# Patient Record
Sex: FEMALE | Race: BLACK OR AFRICAN AMERICAN | NOT HISPANIC OR LATINO | URBAN - METROPOLITAN AREA
[De-identification: names, ages, dates, MRNs, and addresses within clinical notes are randomized per-mention and may not be internally consistent; named-entity substitution may affect disease eponyms.]

---

## 2023-04-08 LAB — URINE CULTURE: NO GROWTH

## 2023-05-02 ENCOUNTER — OFFICE VISIT (OUTPATIENT)
Dept: PRIMARY CARE | Facility: CLINIC | Age: 33
End: 2023-05-02
Payer: COMMERCIAL

## 2023-05-02 ENCOUNTER — APPOINTMENT (OUTPATIENT)
Dept: PRIMARY CARE | Facility: CLINIC | Age: 33
End: 2023-05-02
Payer: COMMERCIAL

## 2023-05-02 VITALS
SYSTOLIC BLOOD PRESSURE: 129 MMHG | HEART RATE: 70 BPM | WEIGHT: 293 LBS | BODY MASS INDEX: 45.99 KG/M2 | HEIGHT: 67 IN | DIASTOLIC BLOOD PRESSURE: 67 MMHG

## 2023-05-02 DIAGNOSIS — E66.01 MORBID OBESITY (MULTI): ICD-10-CM

## 2023-05-02 DIAGNOSIS — Z00.00 ANNUAL PHYSICAL EXAM: ICD-10-CM

## 2023-05-02 DIAGNOSIS — Z01.419 ENCOUNTER FOR GYNECOLOGICAL EXAMINATION WITHOUT ABNORMAL FINDING: Primary | ICD-10-CM

## 2023-05-02 PROBLEM — E88.810 DYSMETABOLIC SYNDROME: Status: ACTIVE | Noted: 2023-05-02

## 2023-05-02 PROBLEM — R53.83 FATIGUE: Status: ACTIVE | Noted: 2023-05-02

## 2023-05-02 PROBLEM — R06.83 SNORING: Status: ACTIVE | Noted: 2023-05-02

## 2023-05-02 PROBLEM — R29.818 SUSPECTED SLEEP APNEA: Status: ACTIVE | Noted: 2023-05-02

## 2023-05-02 PROCEDURE — 3008F BODY MASS INDEX DOCD: CPT | Performed by: NURSE PRACTITIONER

## 2023-05-02 PROCEDURE — 88141 CYTOPATH C/V INTERPRET: CPT | Performed by: PATHOLOGY

## 2023-05-02 PROCEDURE — 87624 HPV HI-RISK TYP POOLED RSLT: CPT

## 2023-05-02 PROCEDURE — 80061 LIPID PANEL: CPT

## 2023-05-02 PROCEDURE — 1036F TOBACCO NON-USER: CPT | Performed by: NURSE PRACTITIONER

## 2023-05-02 PROCEDURE — 82652 VIT D 1 25-DIHYDROXY: CPT

## 2023-05-02 PROCEDURE — 85025 COMPLETE CBC W/AUTO DIFF WBC: CPT

## 2023-05-02 PROCEDURE — 87205 SMEAR GRAM STAIN: CPT

## 2023-05-02 PROCEDURE — 99203 OFFICE O/P NEW LOW 30 MIN: CPT | Performed by: NURSE PRACTITIONER

## 2023-05-02 PROCEDURE — 99385 PREV VISIT NEW AGE 18-39: CPT | Performed by: NURSE PRACTITIONER

## 2023-05-02 PROCEDURE — 80053 COMPREHEN METABOLIC PANEL: CPT

## 2023-05-02 PROCEDURE — 88175 CYTOPATH C/V AUTO FLUID REDO: CPT

## 2023-05-02 PROCEDURE — 82607 VITAMIN B-12: CPT

## 2023-05-02 PROCEDURE — 84443 ASSAY THYROID STIM HORMONE: CPT

## 2023-05-02 RX ORDER — FOLIC ACID 1 MG/1
4 TABLET ORAL DAILY
COMMUNITY
Start: 2023-04-02 | End: 2024-03-08

## 2023-05-02 RX ORDER — LEVETIRACETAM 500 MG/1
1 TABLET ORAL 2 TIMES DAILY
COMMUNITY
Start: 2023-04-17 | End: 2024-03-08 | Stop reason: SDUPTHER

## 2023-05-02 RX ORDER — MIDAZOLAM 5 MG/.1ML
SPRAY NASAL
COMMUNITY
Start: 2023-03-15

## 2023-05-02 NOTE — PROGRESS NOTES
"Reason for Visit: Annual Physical Exam    HPI: Chelsea is a 33 year old female who presents to the office today for a physical exam.     No past surgical history. Medical history of seizures. She is on Keppra and folic acid. No known drug allergies.     Sees neurology for seizure management. Stated she was diagnosed with seizures in December, 2021; after having Covid. Not currently in a relationship. Does not have any children. Works at a  center. Does not smoke cigarettes, drink alcohol or use recreational drugs.     Her menstrual cycles are irregular. The duration of periods is 5 days. LMP on 3/27/2023; misses 3 cycles/year. Possible family hx of PCOS (sister). She is interested in losing weight; would like to see nutritionist.    Paternal grandmother with history of CHF.       Active Problem List  Patient Active Problem List   Diagnosis    Suspected sleep apnea    Snoring    Morbid obesity (CMS/HCC)    Fatigue    Dysmetabolic syndrome       Comprehensive Medical/Surgical/Social/Family History  Past Medical History:   Diagnosis Date    Seizures (CMS/HCC) 12/2021    Urinary tract infection 4/01/23     Past Surgical History:   Procedure Laterality Date    OTHER SURGICAL HISTORY  08/16/2022    No history of surgery    WISDOM TOOTH EXTRACTION       Social History     Social History Narrative    Not on file         Allergies and Medications  Patient has no known allergies.  No current outpatient medications on file prior to visit.     No current facility-administered medications on file prior to visit.         ROS otherwise negative aside from what was mentioned above in HPI.    Vitals  /67   Pulse 70   Ht 1.702 m (5' 7\")   Wt 135 kg (298 lb)   BMI 46.67 kg/m²   Body mass index is 46.67 kg/m².  Physical Exam  Gen: Alert, NAD  HEENT:  PERRLA, EOMI, conjunctiva and sclera normal in appearance. External auditory canals/TMs normal; Oral cavity and posterior pharynx without lesions/exudate  Neck:  " Supple with FROM; No masses/nodes palpable; Thyroid nontender and without nodules; No GIL  Respiratory:  Lungs CTAB  Cardiovascular:  Heart RRR. No M/R/G. Peripheral pulses equal bilaterally  Abdomen:  Soft, nontender, BS present throughout; No R/G/R; No HSM or masses palpated  Extremities:  FROM all extremities; Muscle strength grossly normal with good tone  Neuro:  CN II-XII intact; Reflexes 2+/2+; Gross motor and sensory intact  Skin:  No suspicious lesions present  Breast: No masses, skin lesions or nipple discharges, no axillary lymphadenopathy    Assessment and Plan:  Problem List Items Addressed This Visit          Endocrine/Metabolic    Morbid obesity (CMS/[facility])    Relevant Orders    Referral to Nutrition Services     Other Visit Diagnoses       Encounter for gynecological examination without abnormal finding    -  Primary    Relevant Orders    THINPREP PAP TEST    Vaginitis Gram Stain For Bacterial Vaginosis + Yeast (Completed)    Annual physical exam        Relevant Orders    CBC and Auto Differential (Completed)    Comprehensive Metabolic Panel (Completed)    TSH with reflex to Free T4 if abnormal (Completed)    Vitamin D 1,25 Dihydroxy    Vitamin B12 (Completed)    Lipid Panel (Completed)    BMI 40.0-44.9, adult (CMS/[facility])        Relevant Orders    Referral to Nutrition Services        1) Routine GYN: pap done. You tolerated well. Vaginal swab collected for BV.    2) Annual exam: blood work ordered.    3) Obesity: counseled on weight loss to improve health, cardiovascular health, decrease risk of diabetes and decrease wear and tear on weight bearing joints which will alleviate joint pain. Encouraged exercising such as starting to walk; 15-30 minutes daily, increasing goal of exercise to >150 minutes/week with emphasis on cardio, strengthening and toning. Discussed benefits of gym membership and/or . Cut back on daily calories to 5781-3354 kcal diet. Watch daily carbs such as breads, pasta, rice,  starchy vegetables and sweets. Referral placed for nutritionist.    4) Seizures: stable. Continue Keppra and follow-up with neurology.

## 2023-05-03 LAB
ALANINE AMINOTRANSFERASE (SGPT) (U/L) IN SER/PLAS: 15 U/L (ref 7–45)
ALBUMIN (G/DL) IN SER/PLAS: 3.9 G/DL (ref 3.4–5)
ALKALINE PHOSPHATASE (U/L) IN SER/PLAS: 113 U/L (ref 33–110)
ANION GAP IN SER/PLAS: 13 MMOL/L (ref 10–20)
ASPARTATE AMINOTRANSFERASE (SGOT) (U/L) IN SER/PLAS: 19 U/L (ref 9–39)
BASOPHILS (10*3/UL) IN BLOOD BY AUTOMATED COUNT: 0.07 X10E9/L (ref 0–0.1)
BASOPHILS/100 LEUKOCYTES IN BLOOD BY AUTOMATED COUNT: 1.3 % (ref 0–2)
BILIRUBIN TOTAL (MG/DL) IN SER/PLAS: 0.3 MG/DL (ref 0–1.2)
CALCIUM (MG/DL) IN SER/PLAS: 9.9 MG/DL (ref 8.6–10.6)
CARBON DIOXIDE, TOTAL (MMOL/L) IN SER/PLAS: 28 MMOL/L (ref 21–32)
CHLORIDE (MMOL/L) IN SER/PLAS: 102 MMOL/L (ref 98–107)
CHOLESTEROL (MG/DL) IN SER/PLAS: 206 MG/DL (ref 0–199)
CHOLESTEROL IN HDL (MG/DL) IN SER/PLAS: 59.8 MG/DL
CHOLESTEROL/HDL RATIO: 3.4
CLUE CELLS: NORMAL
COBALAMIN (VITAMIN B12) (PG/ML) IN SER/PLAS: 1120 PG/ML (ref 211–911)
CREATININE (MG/DL) IN SER/PLAS: 0.78 MG/DL (ref 0.5–1.05)
EOSINOPHILS (10*3/UL) IN BLOOD BY AUTOMATED COUNT: 0.21 X10E9/L (ref 0–0.7)
EOSINOPHILS/100 LEUKOCYTES IN BLOOD BY AUTOMATED COUNT: 4 % (ref 0–6)
ERYTHROCYTE DISTRIBUTION WIDTH (RATIO) BY AUTOMATED COUNT: 12.8 % (ref 11.5–14.5)
ERYTHROCYTE MEAN CORPUSCULAR HEMOGLOBIN CONCENTRATION (G/DL) BY AUTOMATED: 30.7 G/DL (ref 32–36)
ERYTHROCYTE MEAN CORPUSCULAR VOLUME (FL) BY AUTOMATED COUNT: 96 FL (ref 80–100)
ERYTHROCYTES (10*6/UL) IN BLOOD BY AUTOMATED COUNT: 4.44 X10E12/L (ref 4–5.2)
GFR FEMALE: >90 ML/MIN/1.73M2
GLUCOSE (MG/DL) IN SER/PLAS: 78 MG/DL (ref 74–99)
HEMATOCRIT (%) IN BLOOD BY AUTOMATED COUNT: 42.7 % (ref 36–46)
HEMOGLOBIN (G/DL) IN BLOOD: 13.1 G/DL (ref 12–16)
IMMATURE GRANULOCYTES/100 LEUKOCYTES IN BLOOD BY AUTOMATED COUNT: 0.4 % (ref 0–0.9)
LDL: 130 MG/DL (ref 0–99)
LEUKOCYTES (10*3/UL) IN BLOOD BY AUTOMATED COUNT: 5.3 X10E9/L (ref 4.4–11.3)
LYMPHOCYTES (10*3/UL) IN BLOOD BY AUTOMATED COUNT: 2.29 X10E9/L (ref 1.2–4.8)
LYMPHOCYTES/100 LEUKOCYTES IN BLOOD BY AUTOMATED COUNT: 43.3 % (ref 13–44)
MONOCYTES (10*3/UL) IN BLOOD BY AUTOMATED COUNT: 0.31 X10E9/L (ref 0.1–1)
MONOCYTES/100 LEUKOCYTES IN BLOOD BY AUTOMATED COUNT: 5.9 % (ref 2–10)
NEUTROPHILS (10*3/UL) IN BLOOD BY AUTOMATED COUNT: 2.39 X10E9/L (ref 1.2–7.7)
NEUTROPHILS/100 LEUKOCYTES IN BLOOD BY AUTOMATED COUNT: 45.1 % (ref 40–80)
NRBC (PER 100 WBCS) BY AUTOMATED COUNT: 0 /100 WBC (ref 0–0)
NUGENT SCORE: 3
PLATELETS (10*3/UL) IN BLOOD AUTOMATED COUNT: 295 X10E9/L (ref 150–450)
POTASSIUM (MMOL/L) IN SER/PLAS: 4.6 MMOL/L (ref 3.5–5.3)
PROTEIN TOTAL: 7.4 G/DL (ref 6.4–8.2)
SODIUM (MMOL/L) IN SER/PLAS: 138 MMOL/L (ref 136–145)
THYROTROPIN (MIU/L) IN SER/PLAS BY DETECTION LIMIT <= 0.05 MIU/L: 1.61 MIU/L (ref 0.44–3.98)
TRIGLYCERIDE (MG/DL) IN SER/PLAS: 83 MG/DL (ref 0–149)
UREA NITROGEN (MG/DL) IN SER/PLAS: 8 MG/DL (ref 6–23)
VLDL: 17 MG/DL (ref 0–40)
YEAST: NORMAL

## 2023-05-05 LAB — VITAMIN D 1,25-DIHYDROXY: 28.1 PG/ML (ref 19.9–79.3)

## 2023-05-08 DIAGNOSIS — E55.9 VITAMIN D DEFICIENCY: ICD-10-CM

## 2023-05-08 RX ORDER — ACETAMINOPHEN 500 MG
50 TABLET ORAL DAILY
Qty: 90 CAPSULE | Refills: 1 | Status: SHIPPED | OUTPATIENT
Start: 2023-05-08 | End: 2023-11-06

## 2023-05-10 LAB
COMPLETE PATHOLOGY REPORT: NORMAL
CONVERTED CLINICAL DIAGNOSIS-HISTORY: NORMAL
CONVERTED DIAGNOSIS COMMENT: NORMAL
CONVERTED FINAL DIAGNOSIS: NORMAL
CONVERTED FINAL REPORT PDF LINK TO COPY AND PASTE: NORMAL

## 2023-05-12 ENCOUNTER — TELEPHONE (OUTPATIENT)
Dept: PRIMARY CARE | Facility: CLINIC | Age: 33
End: 2023-05-12
Payer: COMMERCIAL

## 2023-05-12 DIAGNOSIS — Z01.419 ENCOUNTER FOR GYNECOLOGICAL EXAMINATION WITHOUT ABNORMAL FINDING: ICD-10-CM

## 2023-05-12 DIAGNOSIS — Z01.411 ENCOUNTER FOR GYNECOLOGICAL EXAMINATION WITH ABNORMAL FINDING: ICD-10-CM

## 2023-05-12 NOTE — TELEPHONE ENCOUNTER
----- Message from MONTEZ Sullivan sent at 5/11/2023 10:52 PM EDT -----  Pap shows abnormal cells and HPV. Needs referral to GYN.

## 2023-11-06 DIAGNOSIS — E55.9 VITAMIN D DEFICIENCY: ICD-10-CM

## 2023-11-06 RX ORDER — ACETAMINOPHEN 500 MG
TABLET ORAL DAILY
Qty: 90 CAPSULE | Refills: 1 | Status: SHIPPED | OUTPATIENT
Start: 2023-11-06 | End: 2024-04-29 | Stop reason: SDUPTHER

## 2024-03-08 ENCOUNTER — HOSPITAL ENCOUNTER (EMERGENCY)
Facility: HOSPITAL | Age: 34
Discharge: HOME | End: 2024-03-08
Attending: EMERGENCY MEDICINE
Payer: COMMERCIAL

## 2024-03-08 VITALS
SYSTOLIC BLOOD PRESSURE: 109 MMHG | HEART RATE: 84 BPM | OXYGEN SATURATION: 100 % | BODY MASS INDEX: 37.67 KG/M2 | WEIGHT: 240 LBS | RESPIRATION RATE: 18 BRPM | HEIGHT: 67 IN | DIASTOLIC BLOOD PRESSURE: 48 MMHG | TEMPERATURE: 98.3 F

## 2024-03-08 DIAGNOSIS — G40.919 BREAKTHROUGH SEIZURE (MULTI): Primary | ICD-10-CM

## 2024-03-08 LAB
ALBUMIN SERPL BCP-MCNC: 3.6 G/DL (ref 3.4–5)
ALP SERPL-CCNC: 97 U/L (ref 33–110)
ALT SERPL W P-5'-P-CCNC: 17 U/L (ref 7–45)
ANION GAP SERPL CALC-SCNC: 11 MMOL/L (ref 10–20)
AST SERPL W P-5'-P-CCNC: 20 U/L (ref 9–39)
BASOPHILS # BLD AUTO: 0.05 X10*3/UL (ref 0–0.1)
BASOPHILS NFR BLD AUTO: 0.7 %
BILIRUB SERPL-MCNC: 0.3 MG/DL (ref 0–1.2)
BUN SERPL-MCNC: 9 MG/DL (ref 6–23)
CALCIUM SERPL-MCNC: 9.2 MG/DL (ref 8.6–10.3)
CHLORIDE SERPL-SCNC: 101 MMOL/L (ref 98–107)
CO2 SERPL-SCNC: 29 MMOL/L (ref 21–32)
CREAT SERPL-MCNC: 0.95 MG/DL (ref 0.5–1.05)
EGFRCR SERPLBLD CKD-EPI 2021: 81 ML/MIN/1.73M*2
EOSINOPHIL # BLD AUTO: 0.22 X10*3/UL (ref 0–0.7)
EOSINOPHIL NFR BLD AUTO: 3.1 %
ERYTHROCYTE [DISTWIDTH] IN BLOOD BY AUTOMATED COUNT: 12.3 % (ref 11.5–14.5)
GLUCOSE SERPL-MCNC: 107 MG/DL (ref 74–99)
HCT VFR BLD AUTO: 40.3 % (ref 36–46)
HGB BLD-MCNC: 12.9 G/DL (ref 12–16)
IMM GRANULOCYTES # BLD AUTO: 0.03 X10*3/UL (ref 0–0.7)
IMM GRANULOCYTES NFR BLD AUTO: 0.4 % (ref 0–0.9)
LEVETIRACETAM SERPL-MCNC: 12 UG/ML (ref 10–40)
LYMPHOCYTES # BLD AUTO: 2.63 X10*3/UL (ref 1.2–4.8)
LYMPHOCYTES NFR BLD AUTO: 36.5 %
MAGNESIUM SERPL-MCNC: 1.5 MG/DL (ref 1.6–2.4)
MCH RBC QN AUTO: 28.5 PG (ref 26–34)
MCHC RBC AUTO-ENTMCNC: 32 G/DL (ref 32–36)
MCV RBC AUTO: 89 FL (ref 80–100)
MONOCYTES # BLD AUTO: 0.67 X10*3/UL (ref 0.1–1)
MONOCYTES NFR BLD AUTO: 9.3 %
NEUTROPHILS # BLD AUTO: 3.6 X10*3/UL (ref 1.2–7.7)
NEUTROPHILS NFR BLD AUTO: 50 %
NRBC BLD-RTO: 0 /100 WBCS (ref 0–0)
PLATELET # BLD AUTO: 384 X10*3/UL (ref 150–450)
POTASSIUM SERPL-SCNC: 3.9 MMOL/L (ref 3.5–5.3)
PROT SERPL-MCNC: 7.4 G/DL (ref 6.4–8.2)
RBC # BLD AUTO: 4.52 X10*6/UL (ref 4–5.2)
SODIUM SERPL-SCNC: 137 MMOL/L (ref 136–145)
WBC # BLD AUTO: 7.2 X10*3/UL (ref 4.4–11.3)

## 2024-03-08 PROCEDURE — 2500000001 HC RX 250 WO HCPCS SELF ADMINISTERED DRUGS (ALT 637 FOR MEDICARE OP): Performed by: EMERGENCY MEDICINE

## 2024-03-08 PROCEDURE — 36415 COLL VENOUS BLD VENIPUNCTURE: CPT | Performed by: EMERGENCY MEDICINE

## 2024-03-08 PROCEDURE — 83735 ASSAY OF MAGNESIUM: CPT | Performed by: EMERGENCY MEDICINE

## 2024-03-08 PROCEDURE — 85025 COMPLETE CBC W/AUTO DIFF WBC: CPT | Performed by: EMERGENCY MEDICINE

## 2024-03-08 PROCEDURE — 99283 EMERGENCY DEPT VISIT LOW MDM: CPT

## 2024-03-08 PROCEDURE — 80053 COMPREHEN METABOLIC PANEL: CPT | Performed by: EMERGENCY MEDICINE

## 2024-03-08 PROCEDURE — 80177 DRUG SCRN QUAN LEVETIRACETAM: CPT | Mod: AHULAB | Performed by: EMERGENCY MEDICINE

## 2024-03-08 RX ORDER — LEVETIRACETAM 500 MG/1
1000 TABLET ORAL 2 TIMES DAILY
Qty: 120 TABLET | Refills: 0 | Status: SHIPPED | OUTPATIENT
Start: 2024-03-08 | End: 2024-05-01 | Stop reason: ALTCHOICE

## 2024-03-08 RX ORDER — LEVETIRACETAM 500 MG/1
500 TABLET ORAL ONCE
Status: COMPLETED | OUTPATIENT
Start: 2024-03-08 | End: 2024-03-08

## 2024-03-08 RX ORDER — FOLIC ACID 1 MG/1
1 TABLET ORAL DAILY
Qty: 30 TABLET | Refills: 11 | Status: SHIPPED | OUTPATIENT
Start: 2024-03-08 | End: 2025-03-08

## 2024-03-08 RX ADMIN — LEVETIRACETAM 500 MG: 500 TABLET, FILM COATED ORAL at 15:26

## 2024-03-08 ASSESSMENT — COLUMBIA-SUICIDE SEVERITY RATING SCALE - C-SSRS
1. IN THE PAST MONTH, HAVE YOU WISHED YOU WERE DEAD OR WISHED YOU COULD GO TO SLEEP AND NOT WAKE UP?: NO
2. HAVE YOU ACTUALLY HAD ANY THOUGHTS OF KILLING YOURSELF?: NO
6. HAVE YOU EVER DONE ANYTHING, STARTED TO DO ANYTHING, OR PREPARED TO DO ANYTHING TO END YOUR LIFE?: NO

## 2024-03-08 NOTE — ED PROVIDER NOTES
HPI   Chief Complaint   Patient presents with    Seizures         34-year-old woman past medical history of seizure disorder which started after she had COVID here with a breakthrough seizure.  Patient reports that she has had 2 seizures in the past 2 weeks.  She reports compliance with her Keppra regimen of 500 mg twice a day for the past 2 years.  She sees Mimi Christian from the neurology clinic to manage her seizure disorder.  Patient reports that she had a recent cold and she has been feeling more tired than usual.  She reports that she has not been eating as healthy as normal and thinks that maybe this triggered a seizure.  She did not hit her head when she had the seizure she was already lying down.  She bit her tongue slightly.  No fever or chills or headache or musculoskeletal pain currently.                          Geraldine Coma Scale Score: 15                     Patient History   Past Medical History:   Diagnosis Date    Seizures (CMS/HCC) 12/2021    Urinary tract infection 4/01/23     Past Surgical History:   Procedure Laterality Date    OTHER SURGICAL HISTORY  08/16/2022    No history of surgery    WISDOM TOOTH EXTRACTION       Family History   Problem Relation Name Age of Onset    Hypertension Mother KS     Depression Mother KS     Hypertension Father JS     Diabetes Maternal Grandfather WMS     Alcohol abuse Sister TFS     Depression Sister TFS      Social History     Tobacco Use    Smoking status: Never    Smokeless tobacco: Never   Substance Use Topics    Alcohol use: Never    Drug use: Never       Physical Exam   ED Triage Vitals [03/08/24 1437]   Temperature Heart Rate Respirations BP   36.8 °C (98.3 °F) (!) 105 18 127/79      Pulse Ox Temp src Heart Rate Source Patient Position   95 % -- -- --      BP Location FiO2 (%)     -- --       Physical Exam  Vitals and nursing note reviewed.   Constitutional:       General: She is not in acute distress.     Appearance: Normal appearance. She is not  toxic-appearing.   HENT:      Head: Normocephalic.      Mouth/Throat:      Mouth: Mucous membranes are moist.      Comments:   Abrasion left lateral tongue.  Eyes:      Conjunctiva/sclera: Conjunctivae normal.      Pupils: Pupils are equal, round, and reactive to light.   Cardiovascular:      Rate and Rhythm: Normal rate and regular rhythm.      Pulses:           Radial pulses are 2+ on the right side and 2+ on the left side.   Pulmonary:      Effort: Pulmonary effort is normal. No respiratory distress.      Breath sounds: Normal breath sounds.   Abdominal:      General: Abdomen is flat.      Palpations: Abdomen is soft.      Tenderness: There is no abdominal tenderness. There is no guarding or rebound.   Musculoskeletal:      Cervical back: Normal range of motion and neck supple.      Right lower leg: No edema.      Left lower leg: No edema.   Skin:     General: Skin is warm.   Neurological:      Mental Status: She is alert and oriented to person, place, and time.   Psychiatric:         Mood and Affect: Mood normal.         ED Course & MDM   ED Course as of 03/08/24 1606   Fri Mar 08, 2024   1522 D/w  her neurology NP Nakia Abraham and she recommended increasing Keppra to 1 g twice daily.  Will plan to discharge her on this increased dose and have her follow-up. [JG]      ED Course User Index  [JG] Laura Aburto MD         Diagnoses as of 03/08/24 1606   Breakthrough seizure (CMS/HCC)       Medical Decision Making      Procedure  Procedures     Laura Aburto MD  03/08/24 1606

## 2024-03-08 NOTE — ED TRIAGE NOTES
PT TO ED VIA EMS WITH C/O SEIZURES. PT HAD WITNESSED SEIZURE LASTING APROX 2 MIN AT WORK. PT WAS ALREADY LAYING DOWN WHEN SEIZURE HAPPENED. PT DENIES HITTING HEAD.

## 2024-04-29 ENCOUNTER — OFFICE VISIT (OUTPATIENT)
Dept: NEUROLOGY | Facility: CLINIC | Age: 34
End: 2024-04-29
Payer: COMMERCIAL

## 2024-04-29 ENCOUNTER — LAB (OUTPATIENT)
Dept: LAB | Facility: LAB | Age: 34
End: 2024-04-29
Payer: COMMERCIAL

## 2024-04-29 VITALS — WEIGHT: 293 LBS | BODY MASS INDEX: 48.87 KG/M2 | RESPIRATION RATE: 18 BRPM

## 2024-04-29 DIAGNOSIS — E55.9 VITAMIN D DEFICIENCY: ICD-10-CM

## 2024-04-29 DIAGNOSIS — R56.9 SEIZURE (MULTI): Primary | ICD-10-CM

## 2024-04-29 DIAGNOSIS — R56.9 SEIZURE (MULTI): ICD-10-CM

## 2024-04-29 PROCEDURE — 99214 OFFICE O/P EST MOD 30 MIN: CPT | Performed by: NURSE PRACTITIONER

## 2024-04-29 PROCEDURE — 80177 DRUG SCRN QUAN LEVETIRACETAM: CPT

## 2024-04-29 PROCEDURE — 3008F BODY MASS INDEX DOCD: CPT | Performed by: NURSE PRACTITIONER

## 2024-04-29 PROCEDURE — 1036F TOBACCO NON-USER: CPT | Performed by: NURSE PRACTITIONER

## 2024-04-29 PROCEDURE — 36415 COLL VENOUS BLD VENIPUNCTURE: CPT

## 2024-04-29 RX ORDER — ACETAMINOPHEN 500 MG
2000 TABLET ORAL DAILY
Qty: 90 CAPSULE | Refills: 1 | Status: SHIPPED | OUTPATIENT
Start: 2024-04-29

## 2024-04-29 ASSESSMENT — PAIN SCALES - GENERAL: PAINLEVEL: 3

## 2024-04-29 NOTE — PROGRESS NOTES
Patient ID: Chelsea Gomez 34 y.o.female presenting in follow-up for epilepsy.     HPI    Classification  EPILEPTIC Paroxysmal Episodes  EZ:Unknown  Semiology:  1. Generalized tonic-clonic seizure  - Onset: December 2021  - Frequency: 3 lifetime (last 11/19/22)  History of Status Epilepticus: No  Etiology: Unknown  Comorbidities:None    ED note 1/26/23:The patient reports having 4 previous seizures. She was seen by   neurologist. She was evaluated with imaging and EEG. She was prescribed   Keppra but has not taken it. She reports that she was taking a nap at work   when she apparently had a seizure. She has no memory of the seizure. She was   seen by neurology and prescribed Keppra.     ED 3/3/24: Patient reports that she has had 2 seizures in the past 2 weeks. She reports compliance with her Keppra regimen of 500 mg twice a day for the past 2 years. She did not hit her head when she had the seizure she was already lying down. She bit her tongue slightly. Serum level was 12- increased to 1000mg bid        PRESENT CONCERNS:  she continued LEV 500mg bid since ER visit. This does cause drowsiness.  was able to  nayzilam if needed. Had another seizure last week after the ER visit in March - she does get an overwhelming sensation of tiredness, or a strange smell/taste described as electrical prior to her seizures. Her dad found her and she suffered from a black eye to the right eye and some bruising after this most recent seizure.       Review of Systems  All other systems reviewed and negative unless otherwise stated above    CONTROLLED SUBSTANCE  N/A    RESULTS:  EEG:  No EEG results found for the past 12 months    EKG:  No results found for this or any previous visit (from the past 4464 hour(s)).    LABS:  Admission on 03/08/2024, Discharged on 03/08/2024   Component Date Value    WBC 03/08/2024 7.2     nRBC 03/08/2024 0.0     RBC 03/08/2024 4.52     Hemoglobin 03/08/2024 12.9     Hematocrit 03/08/2024  40.3     MCV 03/08/2024 89     MCH 03/08/2024 28.5     MCHC 03/08/2024 32.0     RDW 03/08/2024 12.3     Platelets 03/08/2024 384     Neutrophils % 03/08/2024 50.0     Immature Granulocytes %,* 03/08/2024 0.4     Lymphocytes % 03/08/2024 36.5     Monocytes % 03/08/2024 9.3     Eosinophils % 03/08/2024 3.1     Basophils % 03/08/2024 0.7     Neutrophils Absolute 03/08/2024 3.60     Immature Granulocytes Ab* 03/08/2024 0.03     Lymphocytes Absolute 03/08/2024 2.63     Monocytes Absolute 03/08/2024 0.67     Eosinophils Absolute 03/08/2024 0.22     Basophils Absolute 03/08/2024 0.05     Glucose 03/08/2024 107 (H)     Sodium 03/08/2024 137     Potassium 03/08/2024 3.9     Chloride 03/08/2024 101     Bicarbonate 03/08/2024 29     Anion Gap 03/08/2024 11     Urea Nitrogen 03/08/2024 9     Creatinine 03/08/2024 0.95     eGFR 03/08/2024 81     Calcium 03/08/2024 9.2     Albumin 03/08/2024 3.6     Alkaline Phosphatase 03/08/2024 97     Total Protein 03/08/2024 7.4     AST 03/08/2024 20     Bilirubin, Total 03/08/2024 0.3     ALT 03/08/2024 17     Keppra 03/08/2024 12     Magnesium 03/08/2024 1.50 (L)        IMAGING:  No MRI head results found for the past 12 months    No CT head results found for the past 12 months    Results for orders placed or performed in visit on 02/10/23   MR BRAIN W AND WO IV CONTRAST    Narrative    MRN: [id number]  Patient Name: ROBERTA MCCARTHY     STUDY:  MRI BRAIN W/WO CONTRAST;  2/10/2023 10:18 am     INDICATION:  3 seizures since December 2021 out of sleep  R56.9: Seizure.     COMPARISON:  CT head 09/28/2022.     ACCESSION NUMBER(S):  [id number]     ORDERING CLINICIAN:  RY CHASE     TECHNIQUE:  Coronal T2, FLAIR, DWI, gradient echo T2 and sagittal and coronal T1  weighted images of brain were acquired. Post contrast T1 weighted  images were acquired after administration of 19 mL Dotarem gadolinium  based intravenous contrast.     FINDINGS:  Diffusion-weighted imaging demonstrates no evidence of  an acute  infarct.     Ventricles, cortical sulci and basal cisterns are within normal  limits given the patient's stated age. There is no extra-axial fluid  collection, mass effect or midline shift.     Hippocampi appear symmetric in size and signal. No visualized  cortical dysplasia or gray matter heterotopia.     Cerebellar tonsils are at the level of the foramen magnum. Partially  empty sella turcica, nonspecific. No abnormal susceptibility artifact.     No significant abnormal parenchymal signal. No abnormal parenchymal  enhancement.     IMPRESSION:  No acute intracranial infarct, mass effect or abnormal parenchymal  enhancement.     Hippocampi appear symmetric in size and signal intensity. No  visualized cortical dysplasia or gray matter heterotopia.   Results for orders placed or performed in visit on 12/20/22   EEG    Narrative    Ordered by an unspecified provider.       Vitals:  Vitals:    04/29/24 0950   Resp: 18       PHYSICAL EXAM:  Neurologic Exam       ASSESSMENT & PLAN:   34 y.o. female presenting in follow-up for peviously diagnosed epilepsy. Semiology as described above    Problem List Items Addressed This Visit    None  Visit Diagnoses       Seizure (Multi)    -  Primary    Relevant Orders    Levetiracetam    EEG    Vitamin D deficiency        Relevant Medications    cholecalciferol (Vitamin D-3) 50 mcg (2,000 unit) capsule          Classification  EPILEPTIC Paroxysmal Episodes  EZ:Unknown  Semiology:  Olfactory/gustatory aura--- Generalized tonic-clonic seizure  - Onset: December 2021  - Frequency: 5 lifetime (last 4/2024)  History of Status Epilepticus: No  Etiology: Unknown  Comorbidities:None    LEV serum level - ED level was 12 but had missed a few doses   Will switch to LEV XR once levels are in - will determine if increase is needed   EMU to classify epilepsy   Seizure precautions   RTC after EMU

## 2024-04-29 NOTE — PATIENT INSTRUCTIONS
I have ordered blood work for you to have completed at the lab. You do not need to bring any paperwork with you if you are going to a [facility]. The results will automatically come to me and I will call or message you with results.     As we discussed I have ordered an evaluation in our Epilepsy Monitoring Unit. This is an inpatient stay in the hospital which typically lasts 3-5 days. During this stay we will evaluate you're seizures and medications. Michael will be contacting you via telephone to schedule this admission. Please have transportation set up to and from the hospital. If you have any questions regarding this please call me at [phone]     Thank you for coming to the Epilepsy Clinic today.  -If you have any sudden new, concerning or worsening symptoms, call 911 and go to the Emergency Room. Otherwise, it was good seeing you today-    -Please follow seizure precautions:   Please do not drive, operate any heavy machinery, swim unsupervised, please shower without collection of water instead of bathe. Be cautious around hot, heavy, or sharp objects. Do not cook with an open flame and do not perform any activities at heights such as on a ladder. These precautions should stay in place until 6 months seizure free and cleared by a provider.    -HOW TO CONTACT JAVIER TORRES EPILEPSY NURSE PRACTITIONER ([phone]).   Instructed to call in the event of seizure, medication refills, or any questions  *Please allow 24-48 hours for non-urgent responses*.  For emergency concerns, please dial 911 or present to the nearest emergency room.  For concerns after business hours (8am-4:30pm) or on weekends please call [phone]  To call and schedule a follow up appointment please call [phone]  -Paperwork may take up to 3 business days to complete-    Every attempt is made to run on time for your appointment, if you are 15 minutes or later for your appoinement you may be asked to  "reschedule    -Compliance education: It is important to continue to try and achieve seizure control because of the potential for injury and illness due to seizures. In a very small minority of patients with generalized tonic clonic seizures (\"grand mal\"), breathing or heart function can stop during a seizure and result in demise (sudden unexpected death in epilepsy or SUDEP). [place] from seizures prevents this kind of outcome-     "

## 2024-04-30 DIAGNOSIS — R56.9 SEIZURE (MULTI): Primary | ICD-10-CM

## 2024-04-30 LAB — LEVETIRACETAM SERPL-MCNC: 17 UG/ML (ref 10–40)

## 2024-05-01 DIAGNOSIS — R56.9 SEIZURE (MULTI): ICD-10-CM

## 2024-05-01 RX ORDER — LEVETIRACETAM 750 MG/1
1500 TABLET, EXTENDED RELEASE ORAL DAILY
Qty: 60 TABLET | Refills: 11 | Status: SHIPPED | OUTPATIENT
Start: 2024-05-01 | End: 2024-05-02

## 2024-05-02 RX ORDER — LEVETIRACETAM 750 MG/1
1500 TABLET, EXTENDED RELEASE ORAL DAILY
Qty: 60 TABLET | Refills: 11 | Status: SHIPPED | OUTPATIENT
Start: 2024-05-02 | End: 2024-06-03 | Stop reason: HOSPADM

## 2024-06-01 ENCOUNTER — APPOINTMENT (OUTPATIENT)
Dept: CARDIOLOGY | Facility: HOSPITAL | Age: 34
End: 2024-06-01
Payer: COMMERCIAL

## 2024-06-01 ENCOUNTER — HOSPITAL ENCOUNTER (INPATIENT)
Dept: NEUROLOGY | Facility: HOSPITAL | Age: 34
LOS: 2 days | Discharge: HOME | End: 2024-06-03
Attending: STUDENT IN AN ORGANIZED HEALTH CARE EDUCATION/TRAINING PROGRAM | Admitting: STUDENT IN AN ORGANIZED HEALTH CARE EDUCATION/TRAINING PROGRAM
Payer: COMMERCIAL

## 2024-06-01 DIAGNOSIS — R56.9 SEIZURE (MULTI): Primary | ICD-10-CM

## 2024-06-01 LAB
ALBUMIN SERPL BCP-MCNC: 3.3 G/DL (ref 3.4–5)
ALP SERPL-CCNC: 99 U/L (ref 33–110)
ALT SERPL W P-5'-P-CCNC: 13 U/L (ref 7–45)
ANION GAP SERPL CALC-SCNC: 15 MMOL/L (ref 10–20)
AST SERPL W P-5'-P-CCNC: 19 U/L (ref 9–39)
BASOPHILS # BLD AUTO: 0.06 X10*3/UL (ref 0–0.1)
BASOPHILS NFR BLD AUTO: 1 %
BILIRUB SERPL-MCNC: 0.2 MG/DL (ref 0–1.2)
BUN SERPL-MCNC: 14 MG/DL (ref 6–23)
CALCIUM SERPL-MCNC: 8.8 MG/DL (ref 8.6–10.6)
CHLORIDE SERPL-SCNC: 104 MMOL/L (ref 98–107)
CO2 SERPL-SCNC: 22 MMOL/L (ref 21–32)
CREAT SERPL-MCNC: 0.83 MG/DL (ref 0.5–1.05)
EGFRCR SERPLBLD CKD-EPI 2021: >90 ML/MIN/1.73M*2
EOSINOPHIL # BLD AUTO: 0.24 X10*3/UL (ref 0–0.7)
EOSINOPHIL NFR BLD AUTO: 4.1 %
ERYTHROCYTE [DISTWIDTH] IN BLOOD BY AUTOMATED COUNT: 12.1 % (ref 11.5–14.5)
GLUCOSE SERPL-MCNC: 91 MG/DL (ref 74–99)
HCT VFR BLD AUTO: 37.5 % (ref 36–46)
HGB BLD-MCNC: 12.2 G/DL (ref 12–16)
IMM GRANULOCYTES # BLD AUTO: 0.04 X10*3/UL (ref 0–0.7)
IMM GRANULOCYTES NFR BLD AUTO: 0.7 % (ref 0–0.9)
LEVETIRACETAM SERPL-MCNC: <2 UG/ML (ref 10–40)
LYMPHOCYTES # BLD AUTO: 2.73 X10*3/UL (ref 1.2–4.8)
LYMPHOCYTES NFR BLD AUTO: 46.3 %
MCH RBC QN AUTO: 29.3 PG (ref 26–34)
MCHC RBC AUTO-ENTMCNC: 32.5 G/DL (ref 32–36)
MCV RBC AUTO: 90 FL (ref 80–100)
MONOCYTES # BLD AUTO: 0.49 X10*3/UL (ref 0.1–1)
MONOCYTES NFR BLD AUTO: 8.3 %
NEUTROPHILS # BLD AUTO: 2.33 X10*3/UL (ref 1.2–7.7)
NEUTROPHILS NFR BLD AUTO: 39.6 %
NRBC BLD-RTO: 0 /100 WBCS (ref 0–0)
PLATELET # BLD AUTO: 302 X10*3/UL (ref 150–450)
POTASSIUM SERPL-SCNC: 4.5 MMOL/L (ref 3.5–5.3)
PROT SERPL-MCNC: 6.3 G/DL (ref 6.4–8.2)
RBC # BLD AUTO: 4.16 X10*6/UL (ref 4–5.2)
SODIUM SERPL-SCNC: 136 MMOL/L (ref 136–145)
WBC # BLD AUTO: 5.9 X10*3/UL (ref 4.4–11.3)

## 2024-06-01 PROCEDURE — 93005 ELECTROCARDIOGRAM TRACING: CPT

## 2024-06-01 PROCEDURE — 1100000001 HC PRIVATE ROOM DAILY

## 2024-06-01 PROCEDURE — 95700 EEG CONT REC W/VID EEG TECH: CPT

## 2024-06-01 PROCEDURE — 95720 EEG PHY/QHP EA INCR W/VEEG: CPT | Performed by: STUDENT IN AN ORGANIZED HEALTH CARE EDUCATION/TRAINING PROGRAM

## 2024-06-01 PROCEDURE — 85025 COMPLETE CBC W/AUTO DIFF WBC: CPT | Performed by: STUDENT IN AN ORGANIZED HEALTH CARE EDUCATION/TRAINING PROGRAM

## 2024-06-01 PROCEDURE — 80177 DRUG SCRN QUAN LEVETIRACETAM: CPT | Performed by: STUDENT IN AN ORGANIZED HEALTH CARE EDUCATION/TRAINING PROGRAM

## 2024-06-01 PROCEDURE — 2500000001 HC RX 250 WO HCPCS SELF ADMINISTERED DRUGS (ALT 637 FOR MEDICARE OP): Performed by: STUDENT IN AN ORGANIZED HEALTH CARE EDUCATION/TRAINING PROGRAM

## 2024-06-01 PROCEDURE — 2500000004 HC RX 250 GENERAL PHARMACY W/ HCPCS (ALT 636 FOR OP/ED): Performed by: STUDENT IN AN ORGANIZED HEALTH CARE EDUCATION/TRAINING PROGRAM

## 2024-06-01 PROCEDURE — 80053 COMPREHEN METABOLIC PANEL: CPT | Performed by: STUDENT IN AN ORGANIZED HEALTH CARE EDUCATION/TRAINING PROGRAM

## 2024-06-01 PROCEDURE — 99223 1ST HOSP IP/OBS HIGH 75: CPT | Performed by: STUDENT IN AN ORGANIZED HEALTH CARE EDUCATION/TRAINING PROGRAM

## 2024-06-01 PROCEDURE — 4A10X4Z MONITORING OF CENTRAL NERVOUS ELECTRICAL ACTIVITY, EXTERNAL APPROACH: ICD-10-PCS | Performed by: STUDENT IN AN ORGANIZED HEALTH CARE EDUCATION/TRAINING PROGRAM

## 2024-06-01 PROCEDURE — 95716 VEEG EA 12-26HR CONT MNTR: CPT

## 2024-06-01 RX ORDER — CHOLECALCIFEROL (VITAMIN D3) 25 MCG
2000 TABLET ORAL DAILY
Status: DISCONTINUED | OUTPATIENT
Start: 2024-06-01 | End: 2024-06-03 | Stop reason: HOSPADM

## 2024-06-01 RX ORDER — FOLIC ACID 1 MG/1
1 TABLET ORAL DAILY
Status: DISCONTINUED | OUTPATIENT
Start: 2024-06-01 | End: 2024-06-03 | Stop reason: HOSPADM

## 2024-06-01 RX ORDER — ACETAMINOPHEN 650 MG/1
650 SUPPOSITORY RECTAL EVERY 4 HOURS PRN
Status: DISCONTINUED | OUTPATIENT
Start: 2024-06-01 | End: 2024-06-03 | Stop reason: HOSPADM

## 2024-06-01 RX ORDER — ACETAMINOPHEN 325 MG/1
650 TABLET ORAL EVERY 4 HOURS PRN
Status: DISCONTINUED | OUTPATIENT
Start: 2024-06-01 | End: 2024-06-03 | Stop reason: HOSPADM

## 2024-06-01 RX ORDER — ACETAMINOPHEN 160 MG/5ML
650 SOLUTION ORAL EVERY 4 HOURS PRN
Status: DISCONTINUED | OUTPATIENT
Start: 2024-06-01 | End: 2024-06-03 | Stop reason: HOSPADM

## 2024-06-01 RX ORDER — LEVETIRACETAM 750 MG/1
750 TABLET ORAL ONCE
Status: DISCONTINUED | OUTPATIENT
Start: 2024-06-01 | End: 2024-06-01

## 2024-06-01 RX ORDER — DIPHENHYDRAMINE HCL 25 MG
25 CAPSULE ORAL EVERY 6 HOURS PRN
Status: DISCONTINUED | OUTPATIENT
Start: 2024-06-01 | End: 2024-06-03 | Stop reason: HOSPADM

## 2024-06-01 RX ORDER — ENOXAPARIN SODIUM 100 MG/ML
40 INJECTION SUBCUTANEOUS ONCE
Status: COMPLETED | OUTPATIENT
Start: 2024-06-01 | End: 2024-06-01

## 2024-06-01 RX ORDER — LEVETIRACETAM 750 MG/1
750 TABLET ORAL NIGHTLY
Status: COMPLETED | OUTPATIENT
Start: 2024-06-01 | End: 2024-06-01

## 2024-06-01 RX ORDER — BISMUTH SUBSALICYLATE 262 MG
1 TABLET,CHEWABLE ORAL DAILY
COMMUNITY

## 2024-06-01 RX ORDER — LORAZEPAM 2 MG/ML
2 INJECTION INTRAMUSCULAR EVERY 5 MIN PRN
Status: DISCONTINUED | OUTPATIENT
Start: 2024-06-01 | End: 2024-06-03 | Stop reason: HOSPADM

## 2024-06-01 RX ADMIN — Medication 2000 UNITS: at 21:01

## 2024-06-01 RX ADMIN — FOLIC ACID 1 MG: 1 TABLET ORAL at 21:01

## 2024-06-01 RX ADMIN — ENOXAPARIN SODIUM 40 MG: 100 INJECTION SUBCUTANEOUS at 21:01

## 2024-06-01 RX ADMIN — LEVETIRACETAM 750 MG: 750 TABLET, FILM COATED ORAL at 21:01

## 2024-06-01 SDOH — SOCIAL STABILITY: SOCIAL INSECURITY: HAVE YOU HAD ANY THOUGHTS OF HARMING ANYONE ELSE?: NO

## 2024-06-01 SDOH — SOCIAL STABILITY: SOCIAL INSECURITY: WERE YOU ABLE TO COMPLETE ALL THE BEHAVIORAL HEALTH SCREENINGS?: YES

## 2024-06-01 SDOH — SOCIAL STABILITY: SOCIAL INSECURITY: HAVE YOU HAD THOUGHTS OF HARMING ANYONE ELSE?: NO

## 2024-06-01 SDOH — SOCIAL STABILITY: SOCIAL INSECURITY: DOES ANYONE TRY TO KEEP YOU FROM HAVING/CONTACTING OTHER FRIENDS OR DOING THINGS OUTSIDE YOUR HOME?: NO

## 2024-06-01 SDOH — SOCIAL STABILITY: SOCIAL INSECURITY: ABUSE: ADULT

## 2024-06-01 SDOH — SOCIAL STABILITY: SOCIAL INSECURITY: DO YOU FEEL ANYONE HAS EXPLOITED OR TAKEN ADVANTAGE OF YOU FINANCIALLY OR OF YOUR PERSONAL PROPERTY?: NO

## 2024-06-01 SDOH — SOCIAL STABILITY: SOCIAL INSECURITY: DO YOU FEEL UNSAFE GOING BACK TO THE PLACE WHERE YOU ARE LIVING?: NO

## 2024-06-01 SDOH — SOCIAL STABILITY: SOCIAL INSECURITY: HAS ANYONE EVER THREATENED TO HURT YOUR FAMILY OR YOUR PETS?: NO

## 2024-06-01 SDOH — SOCIAL STABILITY: SOCIAL INSECURITY: ARE THERE ANY APPARENT SIGNS OF INJURIES/BEHAVIORS THAT COULD BE RELATED TO ABUSE/NEGLECT?: NO

## 2024-06-01 SDOH — SOCIAL STABILITY: SOCIAL INSECURITY: ARE YOU OR HAVE YOU BEEN THREATENED OR ABUSED PHYSICALLY, EMOTIONALLY, OR SEXUALLY BY ANYONE?: NO

## 2024-06-01 ASSESSMENT — ACTIVITIES OF DAILY LIVING (ADL)
JUDGMENT_ADEQUATE_SAFELY_COMPLETE_DAILY_ACTIVITIES: YES
LACK_OF_TRANSPORTATION: NO
LACK_OF_TRANSPORTATION: NO
DRESSING YOURSELF: INDEPENDENT
GROOMING: INDEPENDENT
BATHING: INDEPENDENT
PATIENT'S MEMORY ADEQUATE TO SAFELY COMPLETE DAILY ACTIVITIES?: YES
WALKS IN HOME: INDEPENDENT
HEARING - RIGHT EAR: FUNCTIONAL
HEARING - LEFT EAR: FUNCTIONAL
FEEDING YOURSELF: INDEPENDENT
TOILETING: INDEPENDENT
ADEQUATE_TO_COMPLETE_ADL: YES

## 2024-06-01 ASSESSMENT — LIFESTYLE VARIABLES
HOW MANY STANDARD DRINKS CONTAINING ALCOHOL DO YOU HAVE ON A TYPICAL DAY: PATIENT DOES NOT DRINK
PRESCIPTION_ABUSE_PAST_12_MONTHS: NO
HOW OFTEN DO YOU HAVE A DRINK CONTAINING ALCOHOL: NEVER
HOW OFTEN DO YOU HAVE 6 OR MORE DRINKS ON ONE OCCASION: NEVER
AUDIT-C TOTAL SCORE: 0
SKIP TO QUESTIONS 9-10: 1
AUDIT-C TOTAL SCORE: 0
SUBSTANCE_ABUSE_PAST_12_MONTHS: NO

## 2024-06-01 ASSESSMENT — PAIN SCALES - GENERAL
PAINLEVEL_OUTOF10: 0 - NO PAIN
PAINLEVEL_OUTOF10: 1

## 2024-06-01 ASSESSMENT — COGNITIVE AND FUNCTIONAL STATUS - GENERAL
PATIENT BASELINE BEDBOUND: NO
MOBILITY SCORE: 24
DAILY ACTIVITIY SCORE: 24

## 2024-06-01 ASSESSMENT — PATIENT HEALTH QUESTIONNAIRE - PHQ9
1. LITTLE INTEREST OR PLEASURE IN DOING THINGS: NOT AT ALL
SUM OF ALL RESPONSES TO PHQ9 QUESTIONS 1 & 2: 0
2. FEELING DOWN, DEPRESSED OR HOPELESS: NOT AT ALL

## 2024-06-01 ASSESSMENT — PAIN - FUNCTIONAL ASSESSMENT
PAIN_FUNCTIONAL_ASSESSMENT: 0-10
PAIN_FUNCTIONAL_ASSESSMENT: 0-10

## 2024-06-01 NOTE — PROGRESS NOTES
Pharmacy Admission Order Reconciliation Review    Chelsea Gomez is a 34 y.o. female admitted for Seizure (Multi). Pharmacy reviewed the patient's unreconciled admission medications.    Prior to admission medications that were reviewed and acted on by the pharmacist include:  MVI    These medications have been reconciled.     Any other unreconcilied medications have been addressed and will be ordered or held by the patient's medical team. Medications addressed by the pharmacist may be added or changed by the patient's medical team at any time.    Gita Carrillo, PharmD  Transitions of Care Pharmacist  [facility]  Please reach out via Secure Chat for questions

## 2024-06-01 NOTE — H&P
History Of Present Illness  Chelsea Gomez is a 34 y.o. female presenting for characterization of events.    4D CLASSIFICATION - Paroxysmal events  Semiology: Generalized motor  Onset: 12/2021  Frequency: 3-4 times a year  Last event: 3/2024  Localizing Sx: None  Lateralizing Sx: None  EZ: Unknown  Etiology: Unknown  Comorbidities: None    Workup:  EEG: ( 12/2022 EEG  Impression: This routine EEG is normal in awake and sleep states. No epileptiform discharges or lateralizing signs were seen  AMU: (   None  CTH: (   8/28/22 CTH  IMPRESSION:  No evidence of acute cortical infarct or intracranial hemorrhage.     No evidence of intracranial hemorrhage or displaced skull fracture.    MRI w/wo: (   2/10/23 Brain MRI w/ and w/o contrast  IMPRESSION:  No acute intracranial infarct, mass effect or abnormal parenchymal  enhancement.     Hippocampi appear symmetric in size and signal intensity. No  visualized cortical dysplasia or gray matter heterotopia.    EKG-MT: (   No results found for this or any previous visit (from the past 4464 hour(s)).     Prior ASMs: Keppra IR (nonadherence)  Current ASMs: Keppra ER 1500mg    -------------------------------------------------    EPILEPSY HISTORY  Miss Gomez is a 34-year-old female with no significant PMH who presents for new paroxysmal events starting in December 2021. The patient states that she had her first episode in December 2021 out of sleep. She awoke and felt tired, having soiled herself. The patient states that since then she has had episodes during the day and night time multiple times a year. She states that all of the episodes have been consistent with no significant changes. She was started on Keppra 500 twice a day and increased to 750 twice a day due to breakthrough events. The patient states that breakthrough events were potentially related to multiple misted of medication. The patient was recently transitioned to extended release Keppra without adherence issues or  further events.    ANAMNESIS  PER PATIENT:  The patient states that her events will typically start with a generalized feeling of tiredness/fatigue. She states that this will last for approximately 15 minutes and that she will go to lie down. She will then either lose consciousness or fall asleep. The patient states that she is trying to fall asleep at this time and so is unsure the exact reason she loses consciousness. She reports that she will then awaken at the end of the event generally with EMS or others surrounding her. She boards being initially mildly, confused, but will rapidly return to normal. She reports having bitten her tongue and having had urinary incontinence in the past.    PER WITNESS:    The patient states that others have told her she will go lie down and then will have generalized shaking a couple of minutes later. She witnessed a video of her event that showed her lying down and then generalized shaking for a couple of minutes.    EPILEPSY RISK FACTORS:  Gestation and Birth: Normal  Febrile Seizures: Reports 1 potential as a child  Milestones: Normal  CNS Infections: Denies  CNS Surgeries: Denies  Head Trauma: Denies  FHx of Seizures: Denies    Past Medical History  Past Medical History:   Diagnosis Date    Seizures (Multi) 12/2021    Urinary tract infection 4/01/23     Surgical History  Past Surgical History:   Procedure Laterality Date    OTHER SURGICAL HISTORY  08/16/2022    No history of surgery    WISDOM TOOTH EXTRACTION       Social History  Social History     Tobacco Use    Smoking status: Never    Smokeless tobacco: Never   Substance Use Topics    Alcohol use: Never    Drug use: Never     Allergies  Patient has no known allergies.  (Not in a hospital admission)      Review of Systems  Neurological Exam  Physical Exam  GENERAL APPEARANCE:  No distress, alert, interactive and cooperative.    MENTAL STATE:  Orientation was normal to time, place and person. Recent and remote memory was  intact.  Attention span and concentration were normal. Language testing was normal for comprehension, repetition, expression, and naming. The patient could correctly interpret a picture. General fund of knowledge was intact.     CRANIAL NERVES:  CN 2        Visual fields full to confrontation.  CN 3, 4, 6  Pupils round, 4 mm in diameter, equally reactive to light. Lids symmetric; no ptosis. EOMs normal alignment, full range with normal saccades, pursuit and convergence.  No nystagmus.  CN 5  Facial sensation intact bilaterally.  CN 7  Normal and symmetric facial strength. Nasolabial folds symmetric.  CN 8  Hearing intact to finger rub.  CN 9  Palate elevates symmetrically.  CN 11  Normal strength of shoulder shrug and neck turning.  CN 12  Tongue midline, with normal bulk and strength; no fasciculations.     MOTOR:  Muscle bulk and tone were normal in both upper and lower extremities.  No fasciculations, tremor or other abnormal movements were present.                         R          L  Deltoids        5          5  Biceps          5          5  Triceps          5          5  Wrist Flex      5          5  Wrist Ext       5          5     Hip Flex         5          5  Knee Flex      5          5  Knee Ext        5          5  Dorsiflex         5          5  Plantarflex      5          5     SENSORY:  In both upper and lower extremities, sensation was intact to light touch     COORDINATION:   In both upper extremities, finger-nose-finger was intact without dysmetria or overshoot.  In both lower extremities, heel-to-shin was intact. CHANI were intact in both upper and lower extremities.      GAIT:  Station was stable with a normal base. Gait was stable with a normal arm swing and speed. No ataxia, shuffling, steppage or waddling was present. No circumduction was present. Tandem gait was intact. No Romberg sign was present.    Last Recorded Vitals  There were no vitals taken for this visit.    Relevant Results                                       I have personally reviewed the following imaging results No results found..      Assessment/Plan   Ms. Gomez is a 33 y/o F who presents for characterization of events for multiple paroxysmal episodes.    #Paroxysmal Episode  - cvEEG  - Wean Keppra 750mg tonight  - Will obtain Keppra level    Dispo: Home  F: PO  E: replete PRN  N: regular  A: PIV  Ppx: SCDs  Code: Full    Please page [phone] with any further questions    Wiliam Reid  PGY4 Neurology

## 2024-06-01 NOTE — PROGRESS NOTES
"   06/01/24 6932   Discharge Planning   Living Arrangements Parent  (Lives with both parents)   Support Systems Parent   Assistance Needed none   Type of Residence Private residence   Who is requesting discharge planning? Provider   Home or Post Acute Services None   Patient expects to be discharged to: Home with parents   Does the patient need discharge transport arranged? No  (father will transport home)   Financial Resource Strain   How hard is it for you to pay for the very basics like food, housing, medical care, and heating? Not very   Housing Stability   In the last 12 months, was there a time when you were not able to pay the mortgage or rent on time? N   In the last 12 months, was there a time when you did not have a steady place to sleep or slept in a shelter (including now)? N   Transportation Needs   In the past 12 months, has lack of transportation kept you from medical appointments or from getting medications? no   In the past 12 months, has lack of transportation kept you from meetings, work, or from getting things needed for daily living? No     PCP: Sangita Zurita NP   DATE OF LAST VISIT: \"last year\"  PHARMACY: [facility] in [place] on [address]   RECENT FALLS:  denies   EQUIPMENT USED IN HOME: N/A  HOME O2/CPAP/NEBS: N/A  TRANSPORT HOME: Father   CURRENT HC: None    Address, phone and emergency contact information verified. All questions and concerns answered. Will continue to follow for discharge needs.   "

## 2024-06-01 NOTE — CARE PLAN
The patient's goals for the shift include      The clinical goals for the shift include Will remain free from fall/injury throughout hospital stay.    Over the shift, the patient did meet goal;will continue to maintain fall/seizure precautions.

## 2024-06-02 ENCOUNTER — HOSPITAL ENCOUNTER (OUTPATIENT)
Dept: NEUROLOGY | Facility: HOSPITAL | Age: 34
Discharge: HOME | End: 2024-06-02
Payer: COMMERCIAL

## 2024-06-02 PROCEDURE — 99232 SBSQ HOSP IP/OBS MODERATE 35: CPT | Performed by: STUDENT IN AN ORGANIZED HEALTH CARE EDUCATION/TRAINING PROGRAM

## 2024-06-02 PROCEDURE — 2500000005 HC RX 250 GENERAL PHARMACY W/O HCPCS: Performed by: STUDENT IN AN ORGANIZED HEALTH CARE EDUCATION/TRAINING PROGRAM

## 2024-06-02 PROCEDURE — 95720 EEG PHY/QHP EA INCR W/VEEG: CPT | Performed by: STUDENT IN AN ORGANIZED HEALTH CARE EDUCATION/TRAINING PROGRAM

## 2024-06-02 PROCEDURE — 95716 VEEG EA 12-26HR CONT MNTR: CPT

## 2024-06-02 PROCEDURE — 2500000001 HC RX 250 WO HCPCS SELF ADMINISTERED DRUGS (ALT 637 FOR MEDICARE OP): Performed by: STUDENT IN AN ORGANIZED HEALTH CARE EDUCATION/TRAINING PROGRAM

## 2024-06-02 PROCEDURE — 1100000001 HC PRIVATE ROOM DAILY

## 2024-06-02 RX ORDER — LEVETIRACETAM 500 MG/1
2000 TABLET, EXTENDED RELEASE ORAL DAILY
Qty: 120 TABLET | Refills: 2 | Status: SHIPPED | OUTPATIENT
Start: 2024-06-02 | End: 2024-08-31

## 2024-06-02 RX ADMIN — LEVETIRACETAM 2000 MG: 500 TABLET, EXTENDED RELEASE ORAL at 21:24

## 2024-06-02 RX ADMIN — ACETAMINOPHEN 650 MG: 325 TABLET ORAL at 09:39

## 2024-06-02 RX ADMIN — FOLIC ACID 1 MG: 1 TABLET ORAL at 21:25

## 2024-06-02 RX ADMIN — Medication 2000 UNITS: at 21:24

## 2024-06-02 ASSESSMENT — COGNITIVE AND FUNCTIONAL STATUS - GENERAL
MOBILITY SCORE: 24
MOBILITY SCORE: 24
DAILY ACTIVITIY SCORE: 24
DAILY ACTIVITIY SCORE: 24

## 2024-06-02 ASSESSMENT — PAIN - FUNCTIONAL ASSESSMENT
PAIN_FUNCTIONAL_ASSESSMENT: 0-10
PAIN_FUNCTIONAL_ASSESSMENT: 0-10

## 2024-06-02 ASSESSMENT — PAIN SCALES - GENERAL
PAINLEVEL_OUTOF10: 0 - NO PAIN
PAINLEVEL_OUTOF10: 0 - NO PAIN

## 2024-06-02 NOTE — HOSPITAL COURSE
HPI  Miss Gomez is a 34-year-old female with no significant PMH who presents for new paroxysmal events starting in December 2021. The patient states that she had her first episode in December 2021 out of sleep. She awoke and felt tired, having soiled herself. The patient states that since then she has had episodes during the day and night time multiple times a year. She states that all of the episodes have been consistent with no significant changes. She was started on Keppra 500 twice a day and increased to 750 twice a day due to breakthrough events. The patient states that breakthrough events were potentially related to multiple misted of medication. The patient was recently transitioned to extended release Keppra without adherence issues or further events.     Hospital Course  The patient was admitted from 6/1-6/3 and monitored on cvEEG during this time. The patient was weaned off of Keppra at this time. The patient had a witnessed GTC captured on EEG that showed R frontal lobe onset. The patient was restarted on Keppra ER 2000mg and follow up with Nakia Burt was requested.

## 2024-06-02 NOTE — CARE PLAN
The patient's goals for the shift include to have event on v-EEG for characterization.    The clinical goals for the shift include to have event on v-EEG for characterization.    GTC with HV. Recommendations to address these barriers include continue v-EEG.

## 2024-06-02 NOTE — PROGRESS NOTES
NEUROLOGY EPILEPSY PROGRESS NOTE      Subjective : Patient is doing well this am , no acute complaints overnight.       PHYSICAL EXAMINATION:     General Examination:   Constitutional: Pt is very pleasant, well nourished, well developed. Eyes: See under neurological examination. Musculoskeletal and extremities: See under neurological examination. Psychiatric: Patient is cooperative and friendly, keeps good eye contact, thought content and form are normal.    Neurological Examination:   Mental Status: Pt is awake, alert and oriented to time, place and person. Patient has normal speech, language, good fund of knowledge and intact recent and remote memory.     Cranial Nerves: Grossly intact      Motor examination: Muscle tone is normal globally. No focal atrophy is present. No fasciculation is detected. There is no pronator drift or orbiting.       ==================================================  Classification of Paroxysmal Episodes  ==================================================      1.          Diagnosis: Epileptic Paroxysmal Episode          Epileptogenic Zone: Right Frontal           Seizure Type:               1.                   Epileptic Seizure Semiology:  Dialeptic Seizure => Left Versive Seizure => Generalized Tonic-Clonic Seizure                   Start From: 12/01/2021                   Frequency: Unclear            Lateralizing Sign:  Figure of 4,  Riky's Paralysis, Left Version           Etiology: Unknown      ==================================================  Current Video/EEG  ==================================================      Icatal EEG Findings:           Seizure Type:               1. Seizure Semiology:  Dialeptic Seizure => Generalized Tonic-Clonic Seizure; Lateralizing Sign: Left Head Version, Left Ictal dystonia, Left Figure of 4; EEG Seizure Pattern: Right Frontal    ==================================================  Impression and  Plan  ==================================================      Evolution in last 24 hours: One seizure episode so far       Subjective: Patient is doing well this am       Objective:       Current non-AED Rx:       Impression: Ms. Gomez is a 33 y/o F who presents for characterization of events for multiple paroxysmal episodes. One generalized episode with right frontal onset.       Plans:           1. Continue vEEG          2. Resuming 2000 mg XR keppra tonight           3. Seizure precautions           4. Discharge tomorrow           5. Epilepsy clinic follow up     ==================================================  Medication Table  ==================================================      1. Date: 06/02/2024  Keppra XR®(dose: 2000 mg XR)      ==================================================  Seizure Onset Table  ==================================================  1. Date&Time: 06/01/2024 19:02:42   Seizure/Event #: 1  Seizure/Event Type: Seizure Semiology: Dialeptic Seizure => Generalized Tonic-Clonic Seizure; Lateralizing Sign: Left Head Version, Left Ictal dystonia, Left Figure of 4   Onset Zone: Right frontal    Comments: The patient was hyperventilating when she felt something. Before she could describe it she stops speaking ( dialepsis), blinking-->  left versive-->ictal cry --> left arm dystonic--> B/L asymmetric tonic --> left figure of 4--> jittery phase of tonic--> B/L clonic. The seizure lasted for about two minutes.T he patient was confused for about five minutes after seizure. During the interview, she did not remember having seizure, but she responded to the interview afterwards.      Faheem Alarcon MD   Epilepsy Fellow   [facility]

## 2024-06-03 ENCOUNTER — APPOINTMENT (OUTPATIENT)
Dept: NEUROLOGY | Facility: HOSPITAL | Age: 34
End: 2024-06-03
Payer: COMMERCIAL

## 2024-06-03 ENCOUNTER — PHARMACY VISIT (OUTPATIENT)
Dept: PHARMACY | Facility: CLINIC | Age: 34
End: 2024-06-03
Payer: MEDICAID

## 2024-06-03 VITALS
SYSTOLIC BLOOD PRESSURE: 111 MMHG | WEIGHT: 293 LBS | HEIGHT: 67 IN | HEART RATE: 61 BPM | TEMPERATURE: 97.9 F | OXYGEN SATURATION: 98 % | BODY MASS INDEX: 45.99 KG/M2 | DIASTOLIC BLOOD PRESSURE: 70 MMHG | RESPIRATION RATE: 16 BRPM

## 2024-06-03 PROCEDURE — 2500000001 HC RX 250 WO HCPCS SELF ADMINISTERED DRUGS (ALT 637 FOR MEDICARE OP): Performed by: STUDENT IN AN ORGANIZED HEALTH CARE EDUCATION/TRAINING PROGRAM

## 2024-06-03 PROCEDURE — 99238 HOSP IP/OBS DSCHRG MGMT 30/<: CPT | Performed by: STUDENT IN AN ORGANIZED HEALTH CARE EDUCATION/TRAINING PROGRAM

## 2024-06-03 PROCEDURE — 95713 VEEG 2-12 HR CONT MNTR: CPT

## 2024-06-03 PROCEDURE — 95718 EEG PHYS/QHP 2-12 HR W/VEEG: CPT | Performed by: PSYCHIATRY & NEUROLOGY

## 2024-06-03 PROCEDURE — RXMED WILLOW AMBULATORY MEDICATION CHARGE

## 2024-06-03 RX ADMIN — FOLIC ACID 1 MG: 1 TABLET ORAL at 09:18

## 2024-06-03 RX ADMIN — Medication 2000 UNITS: at 09:17

## 2024-06-03 ASSESSMENT — PAIN SCALES - GENERAL: PAINLEVEL_OUTOF10: 0 - NO PAIN

## 2024-06-03 ASSESSMENT — ACTIVITIES OF DAILY LIVING (ADL): LACK_OF_TRANSPORTATION: NO

## 2024-06-03 ASSESSMENT — PAIN - FUNCTIONAL ASSESSMENT: PAIN_FUNCTIONAL_ASSESSMENT: 0-10

## 2024-06-03 NOTE — CARE PLAN
The patient's goals for the shift include seizure control on increased ASM.    The clinical goals for the shift include seizure control on ASM    No seizures overnight. Possible discharge today.

## 2024-06-03 NOTE — PROGRESS NOTES
06/03/24 1126   Discharge Planning   Living Arrangements Parent;Family members   Support Systems Parent   Assistance Needed none   Type of Residence Private residence   Number of Stairs to Enter Residence 0   Number of Stairs Within Residence 12   Do you have animals or pets at home? Yes   Type of Animals or Pets a cat   Patient expects to be discharged to: home   Does the patient need discharge transport arranged? No  (her father will transport her home)   Financial Resource Strain   How hard is it for you to pay for the very basics like food, housing, medical care, and heating? Not very   Housing Stability   In the last 12 months, was there a time when you were not able to pay the mortgage or rent on time? N   In the last 12 months, how many places have you lived? 1   In the last 12 months, was there a time when you did not have a steady place to sleep or slept in a shelter (including now)? N   Transportation Needs   In the past 12 months, has lack of transportation kept you from medical appointments or from getting medications? no   In the past 12 months, has lack of transportation kept you from meetings, work, or from getting things needed for daily living? No     Assessment Note:  Met with pt introduced myself as  and member of the Care Transitions team for discharge planning.   Pt feels safe at home, and stated she  was independent prior to admission.  Pt drives short distances but has a rid to drs appts.  Pt lives at home with her parents.  Pt's address, phone number and contact information was verified. Pt does not have any other questions/concerns at this time.      Previous Home Care: none  DME: none  Pharmacy: [facility] in [place]  Falls: non recent falls  PCP:  Dr. Sangita Zurita last saw PCP last year.   TRANSPORT HOME: Pt father will transport home      Plan is for pt to return home with no needs.  Care transitions will continue to be available if discharge needs arise.    Franca Jung  LOGAN, ELOY  (ex [phone])

## 2024-06-03 NOTE — DISCHARGE INSTRUCTIONS
MsCharlie Gomez,     You were admitted to the epilepsy monitoring unit to capture seizure activity.  We slowly decreased your home Keppra and were able to capture a seizure.  We restarted your home Keppra and will discharge you to home.  You should resume all of your home medications and follow-up with Nakia Burt.     Do not to drive, use power tools or operate heavy machinery, and should not be on ladders. Use the shower and not the bath. Likewise, refrain from any activity which could result in injury to themselves or others if they had a seizure or lost consciousness. These restrictions should continue until instructed by a doctor to do otherwise.

## 2024-06-04 ENCOUNTER — APPOINTMENT (OUTPATIENT)
Dept: NEUROLOGY | Facility: HOSPITAL | Age: 34
End: 2024-06-04
Payer: COMMERCIAL

## 2024-06-04 LAB
ATRIAL RATE: 64 BPM
P AXIS: 28 DEGREES
P OFFSET: 184 MS
P ONSET: 134 MS
PR INTERVAL: 178 MS
Q ONSET: 223 MS
QRS COUNT: 11 BEATS
QRS DURATION: 76 MS
QT INTERVAL: 404 MS
QTC CALCULATION(BAZETT): 416 MS
QTC FREDERICIA: 412 MS
R AXIS: 11 DEGREES
T AXIS: 28 DEGREES
T OFFSET: 425 MS
VENTRICULAR RATE: 64 BPM

## 2024-06-04 NOTE — DISCHARGE SUMMARY
Discharge Diagnosis  Seizure (Multi)    Epilepsy Quality and Core Measure Topics  The patient was encouraged to keep a seizure diary  The patient was encouraged to practice good sleep hygiene  The risks and benefits of pertinent medications were discussed with the patient and/or family  Addressed all relevant psychiatric comorbidities  First Time Seizures  No this is not the patient's first seizure  Is this patient seizure free?  No, the following changes will be made to reduce seizure frequency: increased home keppra   Should this patient observe standard seizure precautions?  Yes Reviewed seizure precautions with patient; specifically, the patient may not drive, may not operate heavy machinery, ought not swim unsupervised, should shower rather than bath, should be cautious with hot or heavy objects, and should not perform any activities at heights such as on a ladder. This will be re-assessed at the patient's next appointment.   Medication Side Effects Discussion Statement  The risks and benefits of pertinent medications were discussed with the patient and/or kin.  Women with Epilepsy Discussion  Discussion for a female patient with epilepsy of childbearing age consisted of: The risks and benefits of oral contraceptive pills and/or hormone replacement therapy as it pertains to her epilepsy and the treatment thereof., The benefits of daily high-dose folic acid in women with epilepsy of childbearing age., The potential benefits of Vitamin D and calcium supplementation., To please inform us if you plan to get pregnant so that we can review your antiepileptic options and monitor you more frequently., and To please inform us if contraception is changed or discontinued.    Issues Requiring Follow-Up  -Follow-up with Nakia Burt outpatient      Test Results Pending At Discharge  Pending Labs       No current pending labs.            Hospital Course  HPI  Miss Gomez is a 34-year-old female with no significant PMH who  presents for new paroxysmal events starting in December 2021. The patient states that she had her first episode in December 2021 out of sleep. She awoke and felt tired, having soiled herself. The patient states that since then she has had episodes during the day and night time multiple times a year. She states that all of the episodes have been consistent with no significant changes. She was started on Keppra 500 twice a day and increased to 750 twice a day due to breakthrough events. The patient states that breakthrough events were potentially related to multiple misted of medication. The patient was recently transitioned to extended release Keppra without adherence issues or further events.     Hospital Course  The patient was admitted from 6/1-6/3 and monitored on cvEEG during this time.  Keppra level on admission undetectable.  The patient was weaned off of Keppra at this time. The patient had a witnessed GTC captured on EEG that showed R frontal lobe onset. The patient was restarted on Keppra ER 2000mg and follow up with aNkia Burt was requested.    Pertinent Physical Exam At Time of Discharge  Physical Exam    General appearance:  No distress, alert, interactive and cooperative.      Mental Status:  Orientation: oriented to time, place, person and condition   Language: Expression, repetition, naming, comprehension intact.   Follows complex commands across midline    Cranial Nerves:   CN 2   Visual fields full to confrontation.   CN 3, 4, 6   Pupils round, 4 mm in diameter, equally reactive to light. Lids symmetric; no ptosis. EOMs normal alignment, full range with normal saccades, pursuit and convergence. No nystagmus.   CN 5   Facial sensation intact bilaterally.   CN 7   Normal and symmetric facial strength. Nasolabial folds symmetric.   CN 8   Hearing intact to finger rub.  CN 9   Palate elevates symmetrically.   CN 11   Normal strength of shoulder shrug and neck turning.   CN 12   Tongue midline, with  normal bulk and strength; no fasciculations.     Motor:                    R       L  Delt          5       5  Bicep        5       5  Tricep       5       5   Wrist Flex  5       5   Wrist Ext   5       5            5       5    Hip Flex     5       5  Hip Ext      5       5  Leg Ext     5       5  Leg Flex    5       5  DF            5       5  PF            5       5       Sensory: Intact and symmetric to light touch in BUE and BLE      Coordination:  Coordination exam was normal. In both upper extremities, finger-nose-finger was intact without dysmetria or overshoot. In both lower extremities, heel-to-shin was intact.     Gait:  Deferred for patient's safety      Home Medications     Medication List      CHANGE how you take these medications     levETIRAcetam  mg 24 hr tablet; Commonly known as: Keppra XR; Take   4 tablets (2,000 mg) by mouth once daily. Do not crush, chew, or split.;   What changed: medication strength, how much to take, additional   instructions     CONTINUE taking these medications     cholecalciferol 50 mcg (2,000 unit) capsule; Commonly known as: Vitamin   D-3; Take 1 capsule (50 mcg) by mouth once daily.   folic acid 1 mg tablet; Commonly known as: Folvite; Take 1 tablet (1 mg)   by mouth once daily.   multivitamin tablet   nasal spray Nayzilam 5 mg/spray (0.1 mL) spray,non-aerosol; Generic   drug: midazolam       Outpatient Follow-Up  Future Appointments   Date Time Provider Department Center   6/4/2024  8:00 AM [facility] EEG ROOM 1 [facility]   6/5/2024  8:00 AM [facility] EEG ROOM 2 [facility]       Ganga Bond MD PhD

## 2024-06-05 ENCOUNTER — APPOINTMENT (OUTPATIENT)
Dept: NEUROLOGY | Facility: HOSPITAL | Age: 34
End: 2024-06-05
Payer: COMMERCIAL

## 2024-06-29 PROCEDURE — RXMED WILLOW AMBULATORY MEDICATION CHARGE

## 2024-07-05 ENCOUNTER — PHARMACY VISIT (OUTPATIENT)
Dept: PHARMACY | Facility: CLINIC | Age: 34
End: 2024-07-05
Payer: MEDICAID

## 2024-07-28 PROCEDURE — RXMED WILLOW AMBULATORY MEDICATION CHARGE

## 2024-07-31 ENCOUNTER — PHARMACY VISIT (OUTPATIENT)
Dept: PHARMACY | Facility: CLINIC | Age: 34
End: 2024-07-31
Payer: MEDICAID

## 2024-08-11 ENCOUNTER — HOSPITAL ENCOUNTER (EMERGENCY)
Facility: HOSPITAL | Age: 34
Discharge: HOME | End: 2024-08-11
Payer: COMMERCIAL

## 2024-08-11 ENCOUNTER — APPOINTMENT (OUTPATIENT)
Dept: RADIOLOGY | Facility: HOSPITAL | Age: 34
End: 2024-08-11
Payer: COMMERCIAL

## 2024-08-11 VITALS
HEIGHT: 67 IN | BODY MASS INDEX: 45.99 KG/M2 | OXYGEN SATURATION: 100 % | HEART RATE: 84 BPM | DIASTOLIC BLOOD PRESSURE: 84 MMHG | WEIGHT: 293 LBS | SYSTOLIC BLOOD PRESSURE: 117 MMHG | TEMPERATURE: 97.9 F | RESPIRATION RATE: 17 BRPM

## 2024-08-11 DIAGNOSIS — S89.91XA INJURY OF RIGHT KNEE, INITIAL ENCOUNTER: Primary | ICD-10-CM

## 2024-08-11 PROCEDURE — 96372 THER/PROPH/DIAG INJ SC/IM: CPT

## 2024-08-11 PROCEDURE — 2500000004 HC RX 250 GENERAL PHARMACY W/ HCPCS (ALT 636 FOR OP/ED)

## 2024-08-11 PROCEDURE — 73564 X-RAY EXAM KNEE 4 OR MORE: CPT | Mod: RIGHT SIDE | Performed by: RADIOLOGY

## 2024-08-11 PROCEDURE — 73564 X-RAY EXAM KNEE 4 OR MORE: CPT | Mod: RT

## 2024-08-11 PROCEDURE — 99283 EMERGENCY DEPT VISIT LOW MDM: CPT

## 2024-08-11 RX ORDER — ACETAMINOPHEN 325 MG/1
975 TABLET ORAL ONCE
Status: COMPLETED | OUTPATIENT
Start: 2024-08-11 | End: 2024-08-11

## 2024-08-11 RX ORDER — KETOROLAC TROMETHAMINE 30 MG/ML
15 INJECTION, SOLUTION INTRAMUSCULAR; INTRAVENOUS ONCE
Status: COMPLETED | OUTPATIENT
Start: 2024-08-11 | End: 2024-08-11

## 2024-08-11 ASSESSMENT — PAIN DESCRIPTION - LOCATION
LOCATION: LEG
LOCATION: KNEE

## 2024-08-11 ASSESSMENT — PAIN SCALES - GENERAL
PAINLEVEL_OUTOF10: 7
PAINLEVEL_OUTOF10: 4

## 2024-08-11 ASSESSMENT — COLUMBIA-SUICIDE SEVERITY RATING SCALE - C-SSRS
6. HAVE YOU EVER DONE ANYTHING, STARTED TO DO ANYTHING, OR PREPARED TO DO ANYTHING TO END YOUR LIFE?: NO
2. HAVE YOU ACTUALLY HAD ANY THOUGHTS OF KILLING YOURSELF?: NO
1. IN THE PAST MONTH, HAVE YOU WISHED YOU WERE DEAD OR WISHED YOU COULD GO TO SLEEP AND NOT WAKE UP?: NO

## 2024-08-11 ASSESSMENT — PAIN DESCRIPTION - PAIN TYPE: TYPE: ACUTE PAIN

## 2024-08-11 ASSESSMENT — PAIN DESCRIPTION - FREQUENCY: FREQUENCY: CONSTANT/CONTINUOUS

## 2024-08-11 ASSESSMENT — PAIN DESCRIPTION - ONSET: ONSET: GRADUAL

## 2024-08-11 ASSESSMENT — PAIN DESCRIPTION - PROGRESSION: CLINICAL_PROGRESSION: GRADUALLY WORSENING

## 2024-08-11 ASSESSMENT — PAIN - FUNCTIONAL ASSESSMENT: PAIN_FUNCTIONAL_ASSESSMENT: 0-10

## 2024-08-11 ASSESSMENT — PAIN DESCRIPTION - ORIENTATION
ORIENTATION: RIGHT
ORIENTATION: RIGHT

## 2024-08-11 NOTE — Clinical Note
Chelsea Gomez was seen and treated in our emergency department on 8/11/2024.  She may return to work on 08/14/2024.       If you have any questions or concerns, please don't hesitate to call.      Tabatha Alejandro PA-C

## 2024-08-11 NOTE — ED PROVIDER NOTES
HPI   Chief Complaint   Patient presents with    Leg Pain       HPI  HPI: This is 34 y.o. female who presents to the ER complaining of a right knee injury.  Patient states that last night she was walking on wet grass when she slipped and injured her right knee.  She states that she twisted the knee.  Reports pain at the medial and lateral aspect of the knee.  She states that she is able to ambulate unassisted, but has pain with weightbearing and with range of motion of the knee.  She denies any significant swelling of the knee.  No pain into the lower leg, no pain in the calf, tib-fib, ankle, or foot, no pain into the thigh or hip.  She denies any wounds or bleeding.  No rashes or overlying skin changes.  Denies numbness, tingling, or weakness to the extremity.  Denies any other injuries, did not fully fall to the ground, did not hit her head or lose consciousness.  Denies any chance of pregnancy.  Has not taken any medications or tried any remedies for her symptoms.  No other complaints or symptoms voiced.    ROS:  General: No decreased responsiveness, no fever, chills  Neuro: no numbness or tingling  ENMT: No nosebleed  Eyes: No discharge or redness  Skel: + Right knee pain, no neck or back pain  Cardiac: No chest pain   Resp: No shortness of breath  GI: No abdominal pain  Skin: no rash or wounds, no erythema, edema or ecchymosis  Heme: no bleeding or petechiae    PMH: Seizure disorder, morbid obesity  Social History: no smoker, no EtOH, no drugs  Family History: Noncontributory    Physical Exam:  General: Vital signs stable, Pt is alert, no acute distress  Eyes: Conjunctiva normal, EOMs intact  HENMT: Normocephalic, atraumatic.  Moist mucous membranes.   Resp: Respiratory effort is normal, no retractions, no stridor.   CV: Heart is regular rate and rhythm.   Skin: No evidence of trauma, skin is warm and dry. No rashes, lesions or ulcers.  Skel: full range of motion of upper and lower extremities. No midline  tenderness over the cervical thoracic or lumbar spine.  RLE: +mild tenderness over medial and lateral joint lines. No tenderness over the posterior knee or popliteal space. No asymmetric swelling of the knee, no swelling of the thigh, tib fib, calf, foot, or ankle.  No pitting edema. The extensor mechanism is intact. Full range of motion of the knee intact with flexion and extension, mild discomfort with full extension. The remainder of the exam, specifically the tib-fib, ankle, and foot, are nontender. The patella tracks normally. Achilles is intact. There is no warmth or erythema. No evidence of intra-articular infection or cellulitis. No cords.  Compartments are soft to palpation.  Skin is intact. Is neurovascularly intact distally, 2+ DP pulses, cap refill <2 sec, warm and well perfused, sensation intact and equal throughout the BLE.   Neuro: Steady gait, no motor or sensory changes.  Psych: Alert and oriented ×3, judgment is appropriate, normal mood and affect     Patient History   Past Medical History:   Diagnosis Date    Seizures (Multi) 12/2021    Urinary tract infection 4/01/23     Past Surgical History:   Procedure Laterality Date    OTHER SURGICAL HISTORY  08/16/2022    No history of surgery    WISDOM TOOTH EXTRACTION       Family History   Problem Relation Name Age of Onset    Hypertension Mother KS     Depression Mother KS     Hypertension Father JS     Diabetes Maternal Grandfather WMS     Alcohol abuse Sister TFS     Depression Sister TFS      Social History     Tobacco Use    Smoking status: Never    Smokeless tobacco: Never   Substance Use Topics    Alcohol use: Never    Drug use: Never       Physical Exam   ED Triage Vitals [08/11/24 0633]   Temperature Heart Rate Respirations BP   36.4 °C (97.5 °F) 80 16 128/74      Pulse Ox Temp Source Heart Rate Source Patient Position   100 % Oral Monitor Sitting      BP Location FiO2 (%)     Left arm --       Physical Exam      ED Course & MDM   Diagnoses as  of 08/11/24 0801   Injury of right knee, initial encounter     Medical Decision Making  ED course / MDM     Summary:  Patient presented with a right knee injury that occurred yesterday.  Vital signs stable, patient is very well-appearing.  Ambulates unassisted with a steady gait.  On exam, there is mild tenderness over the medial and lateral joint lines, no other areas of tenderness throughout the RLE, patient is able to fully flex and extend the knee actively, some discomfort with range of motion.  Intact full nonpainful range of motion of the remainder of the joints of the RLE.  No overlying skin changes.  No erythema or warmth, no limited range of motion, no evidence of cellulitis or septic joint.  There is no asymmetric edema of the right knee or the remainder of the RLE.  Extremity is neurovascularly intact distally.  No sign of compartment syndrome or neurovascular compromise.  She has no asymmetric edema of the RLE, nontender over the posterior calf or thigh or the posterior knee, and in the setting of a known injury, very low suspicion for DVT, patient agrees ultrasound is not indicated at this time.  X-ray shows mild DJD of the medial tibiofemoral joint, no acute fracture or dislocation.  No knee joint effusion.  Patient was given a dose of Tylenol and Toradol in the ED, which significantly improved her pain. Results and differential were discussed in detail with the patient.  Given the mechanism of her injury with twisting and joint line tenderness, discussed the possibility and suspicion for internal derangement.  Did discuss to consider further testing in the ED, the patient agrees no further workup is required in the ED today, agrees that no x-rays of other body parts, no ultrasound, and no labs indicated at this time.  Patient was placed in a knee immobilizer in the ED, and I expressed the importance of outpatient follow-up with orthopedics, she was given information for follow-up and referral orders  placed today.  Will use OTC analgesics as needed for pain.  Patient is eager for discharge.  Stable for discharge with outpatient PCP and orthopedics follow-up. Patient was given strict return precautions, understands reasons to return to the ED. Also discussed supportive care instructions. I expressed the importance of outpatient follow up with their PCP. All questions were answered, patient expressed understanding and stated that they would comply.    Impression:  1. See diagnosis    Plan: Homegoing. I discussed the differential, results, and discharge plan with the patient and family/friend/caregiver. Patient was advised to follow up with PCP or recommended provider in 2-3 days for another evaluation and exam. I emphasized the importance of follow-up with the physician I referred them to in the timeframe recommended.  I explained reasons for the patient to return to the Emergency Department. They agreed that if they feel their condition is worsening,  if symptoms change, get worse, new symptoms develop prior to follow up, or if they have any other concern they should call 911 immediately for further assistance. If there is no improvement in symptoms in the next 24 hours they are advised to return for further evaluation and exam. I also explained the plan and treatment course. We also discussed medications that were prescribed including common side effects and interactions. The patient was advised to abstain from driving, operating heavy machinery, or making significant decisions while taking medications such as opiates and muscle relaxers that may impair this. I gave the patient an opportunity to ask all questions they had and answered all of them accordingly. They understand return precautions and discharge instructions. Patient and family/friend/caregiver/guardian is in agreement with plan, treatment course, and follow up and states verbally that they will comply.     Disposition: Discharge    This note has  been transcribed using voice recognition and may contain grammatical errors, misplaced words, incorrect words, incorrect phrases or other errors.   Procedure  Procedures     Tabatha Alejandro PA-C  08/11/24 0804

## 2024-08-11 NOTE — ED TRIAGE NOTES
Pt came in for a fall that happened yesterday night. Pt slipped on wet grass and hurt her right leg. Pt can barley walk on it. Pt denies any LOC, blood thinners or head strike.

## 2024-08-12 ENCOUNTER — APPOINTMENT (OUTPATIENT)
Dept: SPORTS MEDICINE | Facility: HOSPITAL | Age: 34
End: 2024-08-12
Payer: COMMERCIAL

## 2024-08-12 ENCOUNTER — OFFICE VISIT (OUTPATIENT)
Dept: ORTHOPEDIC SURGERY | Facility: HOSPITAL | Age: 34
End: 2024-08-12
Payer: COMMERCIAL

## 2024-08-12 ENCOUNTER — APPOINTMENT (OUTPATIENT)
Dept: PRIMARY CARE | Facility: CLINIC | Age: 34
End: 2024-08-12
Payer: COMMERCIAL

## 2024-08-12 VITALS — WEIGHT: 293 LBS | BODY MASS INDEX: 45.99 KG/M2 | HEIGHT: 67 IN

## 2024-08-12 DIAGNOSIS — M22.2X1 PATELLOFEMORAL SYNDROME OF RIGHT KNEE: Primary | ICD-10-CM

## 2024-08-12 DIAGNOSIS — S89.91XA INJURY OF RIGHT KNEE, INITIAL ENCOUNTER: ICD-10-CM

## 2024-08-12 DIAGNOSIS — M17.10 ARTHRITIS OF KNEE: ICD-10-CM

## 2024-08-12 DIAGNOSIS — M71.22 BAKER'S CYST OF KNEE, LEFT: ICD-10-CM

## 2024-08-12 PROCEDURE — 3008F BODY MASS INDEX DOCD: CPT | Performed by: PHYSICIAN ASSISTANT

## 2024-08-12 PROCEDURE — 99204 OFFICE O/P NEW MOD 45 MIN: CPT | Performed by: PHYSICIAN ASSISTANT

## 2024-08-12 PROCEDURE — 99214 OFFICE O/P EST MOD 30 MIN: CPT | Performed by: PHYSICIAN ASSISTANT

## 2024-08-12 ASSESSMENT — PAIN SCALES - GENERAL: PAINLEVEL_OUTOF10: 6

## 2024-08-12 NOTE — PROGRESS NOTES
NPV-   History of Present Illness: Patient presents to the clinic to follow up on right knee pain. Patient states the pain started after falling 2 days ago. Patient states she slipped on wet grass and fell and twisted her knee. Patient was evaluated in the emergency room last night. X-rays showed no fracture or dislocation. Patient was discharged with crutches, tylenol, an ace wrap, and a knee sleeve, which she states have helped. Patient endorses generalized knee pain that is worse with bending and straightening her knee. Denies tingling and numbness.     34 y.o.female  1. Patellofemoral syndrome of right knee        2. Injury of right knee, initial encounter  Referral to Orthopaedic Surgery      3. Arthritis of knee        4. Baker's cyst of knee, left          Mechanism of injury: Fall  Date of Injury/Pain: 8/10/2024  Location of pain: right knee  Frequency of Pain: worse with bending and straightening  Associated symptoms: none.  Previous treatment: tylenol, ace wrap, knee sleeve.   They deny any locking of the knee    27 point review of systems negative except what is stated in HPI     Constitutional Exam: patient's height and weight reviewed, well-kempt  Psychiatric Exam: alert and oriented x 3, appropriate mood and behavior  Eye Exam: NONA, EOMI  Pulmonary Exam: breathing non-labored, no apparent distress  Lymphatic exam: no appreciable lymphadenopathy in the lower extremities  Skin exam: no open lesions, rashes, abrasions or ulcerations  Neurological exam: sensation to light touch intact in both lower extremities in peripheral and dermatomal distributions (except for any abnormalities noted in musculoskeletal exam)      On examination of the right knee:  Normal gait, neutral alignment  No swelling; No effusion bruising or atrophy.  Neutral alignment.    Normal range of motion in extension and flexion.   Normal strength in flexion and extension.  Tenderness to palpation: superiorly and inferiorly to patella,  generalized pain over lateral joint line  No tenderness to palpation over the medial, tibial plateau, femoral condyles, patella, MCL or LCL.    Neurovascularly intact.  Normal sensation to light touch.    Negative Apley's test.   Negative anterior drawer test.  Negative posterior drawer test.    1-1 medial/lateral in 30 degrees flexion, 2-1 medial/lateral at  0 degrees with patellar glide test.   Positive patellar grind test.     I personally reviewed the patient's x-ray images and reports of the right knee. The xrays show no fractures or dislocation.  Mild degenerative changes of medial tibiofemoral joint with slight narrowing and early marginal spurring at the medial femoral condyle.       ASSESSMENT: right knee patellofemoral syndrome, arthritis of right knee, injury of right knee    PLAN: Treatment options were discussed with the patient. The patient was given a prescription for physical therapy.  Physical therapy is medically necessary to improve strength, balance, range of motion and functional outcomes after injury and/or surgery. Patient should avoid deep flexion of the knee including kneeling, squatting or sitting in low chairs.  They should also avoid impact activities such as running and jumping but can use a stationary bike, pool exercises and upper body training. Patient was given a handout and instructed on an at home stretching program.  They should do these exercises 3 times per day for 6 weeks and then daily. Patient can use OTC aspercream for pain and continue to ice and elevate supported at the calf to reduce swelling. All the patient's questions were answered. The patient agrees with the above plan.  Follow up as needed

## 2024-09-06 DIAGNOSIS — R56.9 SEIZURE (MULTI): ICD-10-CM

## 2024-09-06 RX ORDER — LEVETIRACETAM 500 MG/1
2000 TABLET, EXTENDED RELEASE ORAL DAILY
Qty: 120 TABLET | Refills: 2 | Status: SHIPPED | OUTPATIENT
Start: 2024-09-06 | End: 2024-09-06 | Stop reason: SDUPTHER

## 2024-09-06 RX ORDER — LEVETIRACETAM 500 MG/1
2000 TABLET, EXTENDED RELEASE ORAL DAILY
Qty: 120 TABLET | Refills: 2 | Status: SHIPPED | OUTPATIENT
Start: 2024-09-06 | End: 2024-12-05

## 2024-09-09 ENCOUNTER — APPOINTMENT (OUTPATIENT)
Dept: PRIMARY CARE | Facility: CLINIC | Age: 34
End: 2024-09-09
Payer: COMMERCIAL

## 2024-09-09 VITALS
HEART RATE: 90 BPM | WEIGHT: 293 LBS | BODY MASS INDEX: 45.99 KG/M2 | HEIGHT: 67 IN | RESPIRATION RATE: 14 BRPM | OXYGEN SATURATION: 97 % | DIASTOLIC BLOOD PRESSURE: 83 MMHG | SYSTOLIC BLOOD PRESSURE: 117 MMHG

## 2024-09-09 DIAGNOSIS — Z00.00 ANNUAL PHYSICAL EXAM: Primary | ICD-10-CM

## 2024-09-09 DIAGNOSIS — E55.9 VITAMIN D DEFICIENCY: ICD-10-CM

## 2024-09-09 DIAGNOSIS — R56.9 SEIZURE (MULTI): ICD-10-CM

## 2024-09-09 PROCEDURE — 82607 VITAMIN B-12: CPT

## 2024-09-09 PROCEDURE — 3008F BODY MASS INDEX DOCD: CPT | Performed by: NURSE PRACTITIONER

## 2024-09-09 PROCEDURE — 80061 LIPID PANEL: CPT

## 2024-09-09 PROCEDURE — 80048 BASIC METABOLIC PNL TOTAL CA: CPT

## 2024-09-09 PROCEDURE — 99395 PREV VISIT EST AGE 18-39: CPT | Performed by: NURSE PRACTITIONER

## 2024-09-09 PROCEDURE — 82306 VITAMIN D 25 HYDROXY: CPT

## 2024-09-09 PROCEDURE — 1036F TOBACCO NON-USER: CPT | Performed by: NURSE PRACTITIONER

## 2024-09-09 ASSESSMENT — PATIENT HEALTH QUESTIONNAIRE - PHQ9
9. THOUGHTS THAT YOU WOULD BE BETTER OFF DEAD, OR OF HURTING YOURSELF: NOT AT ALL
6. FEELING BAD ABOUT YOURSELF - OR THAT YOU ARE A FAILURE OR HAVE LET YOURSELF OR YOUR FAMILY DOWN: NOT AT ALL
SUM OF ALL RESPONSES TO PHQ QUESTIONS 1-9: 4
10. IF YOU CHECKED OFF ANY PROBLEMS, HOW DIFFICULT HAVE THESE PROBLEMS MADE IT FOR YOU TO DO YOUR WORK, TAKE CARE OF THINGS AT HOME, OR GET ALONG WITH OTHER PEOPLE: SOMEWHAT DIFFICULT
1. LITTLE INTEREST OR PLEASURE IN DOING THINGS: NOT AT ALL
3. TROUBLE FALLING OR STAYING ASLEEP OR SLEEPING TOO MUCH: SEVERAL DAYS
SUM OF ALL RESPONSES TO PHQ9 QUESTIONS 1 AND 2: 0
7. TROUBLE CONCENTRATING ON THINGS, SUCH AS READING THE NEWSPAPER OR WATCHING TELEVISION: NOT AT ALL
2. FEELING DOWN, DEPRESSED OR HOPELESS: NOT AT ALL
4. FEELING TIRED OR HAVING LITTLE ENERGY: MORE THAN HALF THE DAYS
8. MOVING OR SPEAKING SO SLOWLY THAT OTHER PEOPLE COULD HAVE NOTICED. OR THE OPPOSITE, BEING SO FIGETY OR RESTLESS THAT YOU HAVE BEEN MOVING AROUND A LOT MORE THAN USUAL: NOT AT ALL
5. POOR APPETITE OR OVEREATING: SEVERAL DAYS

## 2024-09-09 ASSESSMENT — PROMIS GLOBAL HEALTH SCALE
EMOTIONAL_PROBLEMS: RARELY
CARRYOUT_PHYSICAL_ACTIVITIES: COMPLETELY
CARRYOUT_SOCIAL_ACTIVITIES: GOOD
RATE_MENTAL_HEALTH: EXCELLENT
RATE_GENERAL_HEALTH: GOOD
RATE_PHYSICAL_HEALTH: FAIR
RATE_SOCIAL_SATISFACTION: GOOD
RATE_AVERAGE_FATIGUE: MODERATE
RATE_QUALITY_OF_LIFE: VERY GOOD
RATE_AVERAGE_PAIN: 3

## 2024-09-09 NOTE — PROGRESS NOTES
"Reason for Visit: Annual Physical Exam    HPI: Chelsea is a 34 year old female who presents to the office today for a physical exam. Got switched to XR Keppra for seizures. No seizure since being switched (switched June 2).       Active Problem List  Patient Active Problem List   Diagnosis    Suspected sleep apnea    Snoring    Morbid obesity (Multi)    Fatigue    Dysmetabolic syndrome    Seizure (Multi)       Comprehensive Medical/Surgical/Social/Family History  Past Medical History:   Diagnosis Date    Seizures (Multi) 12/2021    Urinary tract infection 4/01/23     Past Surgical History:   Procedure Laterality Date    OTHER SURGICAL HISTORY  08/16/2022    No history of surgery    WISDOM TOOTH EXTRACTION       Social History     Social History Narrative    Not on file         Allergies and Medications  Patient has no known allergies.  Current Outpatient Medications on File Prior to Visit   Medication Sig Dispense Refill    cholecalciferol (Vitamin D-3) 50 mcg (2,000 unit) capsule Take 1 capsule (50 mcg) by mouth once daily. 90 capsule 1    folic acid (Folvite) 1 mg tablet Take 1 tablet (1 mg) by mouth once daily. 30 tablet 11    levETIRAcetam XR (Keppra XR) 500 mg 24 hr tablet Take 4 tablets (2,000 mg) by mouth once daily. 120 tablet 2    multivitamin tablet Take 1 tablet by mouth once daily.      nasal spray Nayzilam 5 mg/spray (0.1 mL) spray,non-aerosol PLEASE SEE ATTACHED FOR DETAILED DIRECTIONS      [DISCONTINUED] levETIRAcetam XR (Keppra XR) 500 mg 24 hr tablet Take 4 tablets (2,000 mg) by mouth once daily. Do not crush, chew, or split. 120 tablet 2    [DISCONTINUED] levETIRAcetam XR (Keppra XR) 500 mg 24 hr tablet Take 4 tablets (2,000 mg) by mouth once daily. 120 tablet 2     No current facility-administered medications on file prior to visit.         ROS otherwise negative aside from what was mentioned above in HPI.    Vitals  /83   Pulse 90   Resp 14   Ht 1.702 m (5' 7\")   Wt 141 kg (309 lb 12.8 " oz)   LMP 08/12/2024   SpO2 97%   BMI 48.52 kg/m²   Body mass index is 48.52 kg/m².  Physical Exam  Gen: Alert, NAD  HEENT:  PERRLA, EOMI, conjunctiva and sclera normal in appearance. External auditory canals/TMs normal; Oral cavity and posterior pharynx without lesions/exudate  Neck:  Supple with FROM; No masses/nodes palpable; Thyroid nontender and without nodules; No GIL  Respiratory:  Lungs CTAB  Cardiovascular:  Heart RRR. No M/R/G. Peripheral pulses equal bilaterally  Abdomen:  Soft, nontender, BS present throughout; No R/G/R; No HSM or masses palpated  Extremities:  FROM all extremities; Muscle strength grossly normal with good tone  Neuro:  CN II-XII intact; Reflexes 2+/2+; Gross motor and sensory intact  Skin:  No suspicious lesions present  Breast: No masses, skin lesions or nipple discharges, no axillary lymphadenopathy    Assessment and Plan:  Problem List Items Addressed This Visit       Seizure (Multi)     Other Visit Diagnoses       Annual physical exam    -  Primary    Relevant Orders    Vitamin B12    Lipid Panel    Basic Metabolic Panel    Vitamin D deficiency        Relevant Orders    Vitamin D 25-Hydroxy,Total (for eval of Vitamin D levels)        1) Annual physical: routine blood work ordered. Up-to-date on pap.    2) Vitamin D deficiency: vitamin D level ordered. Counseled on foods high in vitamin D (tuna, salmon, milk, orange juice, soy milk, cereals, beef liver, cheese, egg yolks and mushrooms).

## 2024-09-10 LAB
25(OH)D3 SERPL-MCNC: 59 NG/ML (ref 30–100)
ANION GAP SERPL CALC-SCNC: 11 MMOL/L (ref 10–20)
BUN SERPL-MCNC: 9 MG/DL (ref 6–23)
CALCIUM SERPL-MCNC: 9.8 MG/DL (ref 8.6–10.6)
CHLORIDE SERPL-SCNC: 102 MMOL/L (ref 98–107)
CHOLEST SERPL-MCNC: 210 MG/DL (ref 0–199)
CHOLESTEROL/HDL RATIO: 3.8
CO2 SERPL-SCNC: 26 MMOL/L (ref 21–32)
CREAT SERPL-MCNC: 0.84 MG/DL (ref 0.5–1.05)
EGFRCR SERPLBLD CKD-EPI 2021: >90 ML/MIN/1.73M*2
GLUCOSE SERPL-MCNC: 82 MG/DL (ref 74–99)
HDLC SERPL-MCNC: 55.4 MG/DL
LDLC SERPL CALC-MCNC: 134 MG/DL
NON HDL CHOLESTEROL: 155 MG/DL (ref 0–149)
POTASSIUM SERPL-SCNC: 4.4 MMOL/L (ref 3.5–5.3)
SODIUM SERPL-SCNC: 135 MMOL/L (ref 136–145)
TRIGL SERPL-MCNC: 101 MG/DL (ref 0–149)
VIT B12 SERPL-MCNC: 850 PG/ML (ref 211–911)
VLDL: 20 MG/DL (ref 0–40)

## 2024-09-16 ENCOUNTER — OFFICE VISIT (OUTPATIENT)
Dept: NEUROLOGY | Facility: CLINIC | Age: 34
End: 2024-09-16
Payer: COMMERCIAL

## 2024-09-16 VITALS
RESPIRATION RATE: 18 BRPM | HEART RATE: 74 BPM | SYSTOLIC BLOOD PRESSURE: 96 MMHG | BODY MASS INDEX: 48.71 KG/M2 | DIASTOLIC BLOOD PRESSURE: 64 MMHG | WEIGHT: 293 LBS

## 2024-09-16 DIAGNOSIS — G40.109 FOCAL EPILEPSY (MULTI): Primary | ICD-10-CM

## 2024-09-16 DIAGNOSIS — R56.9 SEIZURE (MULTI): ICD-10-CM

## 2024-09-16 PROCEDURE — 1036F TOBACCO NON-USER: CPT | Performed by: NURSE PRACTITIONER

## 2024-09-16 PROCEDURE — 99214 OFFICE O/P EST MOD 30 MIN: CPT | Performed by: NURSE PRACTITIONER

## 2024-09-16 RX ORDER — LEVETIRACETAM 500 MG/1
2000 TABLET, EXTENDED RELEASE ORAL DAILY
Qty: 120 TABLET | Refills: 2 | Status: SHIPPED | OUTPATIENT
Start: 2024-09-16 | End: 2024-12-15

## 2024-09-16 ASSESSMENT — PAIN SCALES - GENERAL: PAINLEVEL: 0-NO PAIN

## 2024-09-16 NOTE — PATIENT INSTRUCTIONS
"[email or web address]    Thank you for coming to the [facility]psy [facility] today.    -If you have any sudden new, concerning or worsening symptoms, call 911 and go to the Emergency Room. Otherwise, it was good seeing you today-  -  Your seizures are well controlled on the current medication. Additional prescription refills was sent to the pharmacy.    As we discussed, because you have not had any events or seizures where you lose awareness or control of your actions you have no restrictions at this time. However, if you have an event of seizure where this were to occur, you must inform our office, and we will reassess things. In this event, you stop driving, using heavy machinery, climbing heights, or engaging in any other activity that would cause harm/death to yourself or others were you to lose awareness/consciousness and have a seizure. These restrictions would need to stay in place until at least 6 months of seizure freedom and clearance your physician.    -HOW TO CONTACT JAVIER TORRES EPILEPSY NURSE PRACTITIONER ([phone]).   Instructed to call in the event of seizure, medication refills, or any questions  *Please allow 24-48 hours for non-urgent responses*.  For emergency concerns, please dial 911 or present to the nearest emergency room.  For concerns after business hours (8am-4:30pm) or on weekends please call [phone]  To call and schedule a follow up appointment please call [phone]  -Paperwork may take up to 3 business days to complete-    Every attempt is made to run on time for your appointment, if you are 15 minutes or later for your appoinement you may be asked to reschedule    -Compliance education: It is important to continue to try and achieve seizure control because of the potential for injury and illness due to seizures. In a very small minority of patients with generalized tonic clonic seizures (\"grand mal\"), breathing or heart function can stop " during a seizure and result in demise (sudden unexpected death in epilepsy or SUDEP). [place] from seizures prevents this kind of outcome-     Please continue taking levetiracetam (Keppra) 2000mg daily  At higher doses, some people experience drowsiness or irritability. Not something that I'd expect on your dose, but let me know if this is something you experience.     The [facility]  <http://www.epilepsyinfo.org/>  phone number: 376.433.8707.       [facility]   [facility]  [phone]

## 2024-09-16 NOTE — PROGRESS NOTES
Patient ID: Chelsea Gomez 34 y.o.female presenting in follow-up for epilepsy.     HPI    Classification  EPILEPTIC Paroxysmal Episodes  EZ: right frontal   Semiology:  1. Generalized tonic-clonic seizure  - Onset: December 2021  - Frequency: 3 lifetime (last 11/19/22)  History of Status Epilepticus: No  Etiology: Unknown  Comorbidities:None    ED note 1/26/23:The patient reports having 4 previous seizures. She was seen by   neurologist. She was evaluated with imaging and EEG. She was prescribed   Keppra but has not taken it. She reports that she was taking a nap at work   when she apparently had a seizure. She has no memory of the seizure. She was   seen by neurology and prescribed Keppra.     ED 3/3/24: Patient reports that she has had 2 seizures in the past 2 weeks. She reports compliance with her Keppra regimen of 500 mg twice a day for the past 2 years. She did not hit her head when she had the seizure she was already lying down. She bit her tongue slightly. Serum level was 12- increased to 1000mg bid      Hospital Course June 2024 EMU   The patient was admitted from 6/1-6/3 and monitored on cvEEG during this time.  Keppra level on admission undetectable.  The patient was weaned off of Keppra at this time. The patient had a witnessed GTC captured on EEG that showed R frontal lobe onset. The patient was restarted on Keppra ER 2000mg and follow up     PRESENT CONCERNS:  patient denies any auras, shaking, jerking, convulsions, loss of time, zoning out, waking up with tongue or cheek biting, incontinence or unexplained bruising  Tolerating LEV XR 2g at night. Still doesn't like the idea of taking prescription medication everyday but is compliant.       Review of Systems  All other systems reviewed and negative unless otherwise stated above    CONTROLLED SUBSTANCE  N/A    RESULTS:  EEG:  EEG    Result Date: 6/2/2024  IMPRESSION Impression This vEEG is indicative of a right frontal lobe epilepsy. One seizure  arising from the right frontopolar region was captured on 06/01/24 at 19:02:42. A full report will be scanned into the patient's chart at a later time. This report has been interpreted and electronically signed by     EKG:  Encounter Date: 06/01/24   ECG 12 lead on admission   Result Value    Ventricular Rate 64    Atrial Rate 64    WV Interval 178    QRS Duration 76    QT Interval 404    QTC Calculation(Bazett) 416    P Axis 28    R Axis 11    T Axis 28    QRS Count 11    Q Onset 223    P Onset 134    P Offset 184    T Offset 425    QTC Fredericia 412    Narrative    Normal sinus rhythm with sinus arrhythmia  Low voltage QRS  Borderline ECG  When compared with ECG of 23-MAY-2023 15:28,  Previous ECG has undetermined rhythm, needs review  Confirmed by Amaury Gomez ([id number]) on 6/4/2024 9:13:49 AM       LABS:  Office Visit on 09/09/2024   Component Date Value    Vitamin D, 25-Hydroxy, T* 09/09/2024 59     Vitamin B12 09/09/2024 850     Cholesterol 09/09/2024 210 (H)     HDL-Cholesterol 09/09/2024 55.4     Cholesterol/HDL Ratio 09/09/2024 3.8     LDL Calculated 09/09/2024 134 (H)     VLDL 09/09/2024 20     Triglycerides 09/09/2024 101     Non HDL Cholesterol 09/09/2024 155 (H)     Glucose 09/09/2024 82     Sodium 09/09/2024 135 (L)     Potassium 09/09/2024 4.4     Chloride 09/09/2024 102     Bicarbonate 09/09/2024 26     Anion Gap 09/09/2024 11     Urea Nitrogen 09/09/2024 9     Creatinine 09/09/2024 0.84     eGFR 09/09/2024 >90     Calcium 09/09/2024 9.8        IMAGING:  No MRI head results found for the past 12 months    No CT head results found for the past 12 months    Results for orders placed or performed during the hospital encounter of 06/01/24   EEG    Impression    IMPRESSION    Impression    This vEEG is indicative of a right frontal lobe epilepsy. One seizure arising from the right frontopolar region was captured on 06/01/24 at 19:02:42.    A full report will be scanned into the patient's chart at a later  time.      This report has been interpreted and electronically signed by   Results for orders placed or performed in visit on 02/10/23   MR BRAIN W AND WO IV CONTRAST    Narrative    MRN: [id number]  Patient Name: ROBERTA MCCARTHY     STUDY:  MRI BRAIN W/WO CONTRAST;  2/10/2023 10:18 am     INDICATION:  3 seizures since December 2021 out of sleep  R56.9: Seizure.     COMPARISON:  CT head 09/28/2022.     ACCESSION NUMBER(S):  [id number]     ORDERING CLINICIAN:  RY CHASE     TECHNIQUE:  Coronal T2, FLAIR, DWI, gradient echo T2 and sagittal and coronal T1  weighted images of brain were acquired. Post contrast T1 weighted  images were acquired after administration of 19 mL Dotarem gadolinium  based intravenous contrast.     FINDINGS:  Diffusion-weighted imaging demonstrates no evidence of an acute  infarct.     Ventricles, cortical sulci and basal cisterns are within normal  limits given the patient's stated age. There is no extra-axial fluid  collection, mass effect or midline shift.     Hippocampi appear symmetric in size and signal. No visualized  cortical dysplasia or gray matter heterotopia.     Cerebellar tonsils are at the level of the foramen magnum. Partially  empty sella turcica, nonspecific. No abnormal susceptibility artifact.     No significant abnormal parenchymal signal. No abnormal parenchymal  enhancement.     IMPRESSION:  No acute intracranial infarct, mass effect or abnormal parenchymal  enhancement.     Hippocampi appear symmetric in size and signal intensity. No  visualized cortical dysplasia or gray matter heterotopia.       Vitals:  Vitals:    09/16/24 1157   BP: 96/64   Pulse: 74   Resp: 18         PHYSICAL EXAM:  Neurologic Exam   The patient was alert and oriented to person, time and place. Language, concentration and memory were intact. Affect was normal.   Eye movements were intact. Muscles of mastication and facial expression moved normally. Hearing was normal bilaterally. There was no  dysarthria.  Coordination in the arms and legs was intact  Involuntary movements: none.  Standard gait was normal      ASSESSMENT & PLAN:   34 y.o. female presenting in follow-up for peviously diagnosed epilepsy. Semiology as described above    Problem List Items Addressed This Visit       Focal epilepsy (Multi) - Primary       Classification  EPILEPTIC Paroxysmal Episodes  EZ: Right Frontal   Semiology:  Olfactory/gustatory aura--- Generalized tonic-clonic seizure  - Onset: December 2021  - Frequency: 5 lifetime (last 4/2024)  History of Status Epilepticus: No  Etiology: Unknown  Comorbidities:None      We discussed that the EMU was diagnostic of right frontal epilepsy. Discussed her imaging is normal so the cause is idiopathic and that first line treatment is Anti-seizure medication. We discussed using 1 medication and increasing as tolerating if seizures recur without identifiable trigger and that in some medications seizure can be refractory and need more than 1 Anti-seizure medication. She should not miss any doses of the LEV. Contact the office if any breakthrough seizures.     Continue LEV XR 2g at night   RTC 6 months

## 2024-09-19 ENCOUNTER — EVALUATION (OUTPATIENT)
Dept: PHYSICAL THERAPY | Facility: CLINIC | Age: 34
End: 2024-09-19
Payer: COMMERCIAL

## 2024-09-19 DIAGNOSIS — M71.22 BAKER'S CYST OF KNEE, LEFT: ICD-10-CM

## 2024-09-19 DIAGNOSIS — M17.10 ARTHRITIS OF KNEE: ICD-10-CM

## 2024-09-19 DIAGNOSIS — M22.2X1 PATELLOFEMORAL SYNDROME OF RIGHT KNEE: ICD-10-CM

## 2024-09-19 PROCEDURE — 97161 PT EVAL LOW COMPLEX 20 MIN: CPT | Mod: GP | Performed by: PHYSICAL THERAPIST

## 2024-09-19 PROCEDURE — 97110 THERAPEUTIC EXERCISES: CPT | Mod: GP | Performed by: PHYSICAL THERAPIST

## 2024-09-19 ASSESSMENT — ENCOUNTER SYMPTOMS
DEPRESSION: 0
OCCASIONAL FEELINGS OF UNSTEADINESS: 0
LOSS OF SENSATION IN FEET: 0

## 2024-09-19 NOTE — PROGRESS NOTES
Physical Therapy    Physical Therapy Evaluation and Treatment      Patient Name: Chelsea Gomez  MRN: [id number]  Today's Date: 9/19/2024  Visit: 1  Insurance: Reviewed  Physician: Ortiz Santo  Problem List Items Addressed This Visit    None  Visit Diagnoses         Codes    Patellofemoral syndrome of right knee     M22.2X1    Relevant Orders    Follow Up In Physical Therapy    Arthritis of knee     M17.10    Relevant Orders    Follow Up In Physical Therapy    Baker's cyst of knee, left     M71.22    Relevant Orders    Follow Up In Physical Therapy             Time Entry:   Time Calculation  Start Time: 1035  Stop Time: 1115  Time Calculation (min): 40 min  PT Evaluation Time Entry  PT Evaluation (Low) Time Entry: 30  PT Therapeutic Procedures Time Entry  Therapeutic Exercise Time Entry: 10    Assessment: Patient seen in PT for Initial Evaluation for knee pain following fall.   Patient presents with postural deviation  decreased knee ROM , decreased knee strength, knee tenderness, pain with patellar compression.  Functionally, patient  unable to workout.  Patient rates LEFS at 57.5%.    Plan:  Continue with POC  SciFit stepper, knee strength/ROM, CKC, PRE's, CP         Current Problem:   1. Patellofemoral syndrome of right knee  Referral to Physical Therapy    Follow Up In Physical Therapy      2. Arthritis of knee  Referral to Physical Therapy    Follow Up In Physical Therapy      3. Baker's cyst of knee, left  Referral to Physical Therapy    Follow Up In Physical Therapy          Subjective    General: Patient with a history of right knee pain since 8/9/24.  Onset was after slipping on wet grass and knee bent and twisted.  Patient treated with toradol, crutches, ice, and ace bandage.  Patient complains of pain in the region of the right ant knee, ache pain, 3/10 at worst last 7 days.  Patient's pain is worse with climb up on bed, activity.  Functionally, patient is unable to return to gym..   - work.    Xray shows knee oa       Precautions: seizure hx, fall risk  Precautions  STEADI Fall Risk Score (The score of 4 or more indicates an increased risk of falling): 5  Prior Level of Function: no limitation       Objective     Postural deviation: valgus  Right  knee ROM to 0 extension and 125 flexion  right Knee strength 4/5 quads and 4/5 hamstrings  Gait deviation: normal gait, steps reciprocal with good form  Tender to palpation at the retropatellar  Balance: 10 sec SLS on left, 7 sec SLS on right     Outcome Measures:  Other Measures  Lower Extremity Funtional Score (LEFS): 46     Treatments:  Patient instructed in HEP including: ball squeezes, hip abd/er with blue theraband, SAQ, SLR and quad set 20 times each (10 minutes)      EDUCATION: HEP       Goals:  Active       PT Problem       PT Goal 1       Start:  09/19/24    Expected End:  12/18/24       Knee pain 0/10         PT Goal 2       Start:  09/19/24    Expected End:  12/18/24       Improve LEFS by 20%         PT Goal 3       Start:  09/19/24    Expected End:  12/18/24       Maximize knee ROM          PT Goal 4       Start:  09/19/24    Expected End:  12/18/24       Knee strength 5/5

## 2024-09-24 ENCOUNTER — APPOINTMENT (OUTPATIENT)
Dept: PHYSICAL THERAPY | Facility: CLINIC | Age: 34
End: 2024-09-24
Payer: COMMERCIAL

## 2024-09-24 ENCOUNTER — DOCUMENTATION (OUTPATIENT)
Dept: PHYSICAL THERAPY | Facility: CLINIC | Age: 34
End: 2024-09-24
Payer: COMMERCIAL

## 2024-09-24 NOTE — PROGRESS NOTES
Physical Therapy                 Therapy Communication Note    Patient Name: Chelsea Gomez  MRN: [id number]  Department:   Room: Room/bed info not found  Today's Date: 9/24/2024     Discipline: Physical Therapy    Missed Visit Reason:  canceled with , no reason listed.     Missed Time: Cancel    Comment:

## 2024-10-10 ENCOUNTER — TREATMENT (OUTPATIENT)
Dept: PHYSICAL THERAPY | Facility: CLINIC | Age: 34
End: 2024-10-10
Payer: COMMERCIAL

## 2024-10-10 DIAGNOSIS — M22.2X1 PATELLOFEMORAL SYNDROME OF RIGHT KNEE: ICD-10-CM

## 2024-10-10 DIAGNOSIS — M17.10 ARTHRITIS OF KNEE: ICD-10-CM

## 2024-10-10 DIAGNOSIS — M71.22 BAKER'S CYST OF KNEE, LEFT: ICD-10-CM

## 2024-10-10 PROCEDURE — 97110 THERAPEUTIC EXERCISES: CPT | Mod: GP,CQ

## 2024-10-10 ASSESSMENT — PAIN - FUNCTIONAL ASSESSMENT: PAIN_FUNCTIONAL_ASSESSMENT: 0-10

## 2024-10-10 ASSESSMENT — PAIN SCALES - GENERAL: PAINLEVEL_OUTOF10: 4

## 2024-10-10 NOTE — PROGRESS NOTES
"Physical Therapy    Physical Therapy Treatment    Patient Name: Chelsea Gomez  MRN: [id number]  Today's Date: 10/10/2024  Time Entry:   Time Calculation  Start Time: 0100  Stop Time: 0145  Time Calculation (min): 45 min     PT Therapeutic Procedures Time Entry  Therapeutic Exercise Time Entry: 45                 Visit: 2  Insurance: Reviewed  Physician: Ortiz Santo    Assessment:  PT Assessment  Evaluation/Treatment Tolerance: Patient tolerated treatment well  Response to treatment is good overall. Performs therex with quick grasp and good understanding. Reports knee aggravation after step ups, level is \"normal\" low and tolerable. Remaining exercises no reported issues. Review of current HEP and modifications.     Plan:   Continue with POC  SciFit stepper, knee strength/ROM, CKC, PRE's, CP    Current Problem  1. Patellofemoral syndrome of right knee  Follow Up In Physical Therapy      2. Arthritis of knee  Follow Up In Physical Therapy      3. Baker's cyst of knee, left  Follow Up In Physical Therapy      General        Subjective    I have tried going back to the gym, soreness afterwards  Pain of right knee is a 4/10  Left knee is fine at this time  HEP completed every other day with time spent in the gym    Pain  Pain Assessment  Pain Assessment: 0-10  0-10 (Numeric) Pain Score: 4    Objective     Treatments:  Therapeutic Exercise  Therapeutic Exercise Performed: Yes  Stepper x 8 min lvl 3  Lunges on 2nd step x 15 each leg  6\" step ups x 15 each leg  Aeromat heel toe walking x 3  Aeromat SLS 3 x 15 sec Each leg  Hip adduction x 20  Hip abduction x 20 Blue TB  Bridge with medium MB x 20  SAQ 3# AW x 25  Standing hip extension/abduction 3# AW x 20 each leg    OP EDUCATION:     Goals:  Active       PT Problem       PT Goal 1       Start:  09/19/24    Expected End:  12/18/24       Knee pain 0/10         PT Goal 2       Start:  09/19/24    Expected End:  12/18/24       Improve LEFS by 20%         PT Goal 3       " Start:  09/19/24    Expected End:  12/18/24       Maximize knee ROM          PT Goal 4       Start:  09/19/24    Expected End:  12/18/24       Knee strength 5/5

## 2024-10-16 ENCOUNTER — APPOINTMENT (OUTPATIENT)
Dept: PHYSICAL THERAPY | Facility: CLINIC | Age: 34
End: 2024-10-16
Payer: COMMERCIAL

## 2024-10-21 ENCOUNTER — TREATMENT (OUTPATIENT)
Dept: PHYSICAL THERAPY | Facility: CLINIC | Age: 34
End: 2024-10-21
Payer: COMMERCIAL

## 2024-10-21 DIAGNOSIS — M22.2X1 PATELLOFEMORAL SYNDROME OF RIGHT KNEE: ICD-10-CM

## 2024-10-21 DIAGNOSIS — M71.22 BAKER'S CYST OF KNEE, LEFT: ICD-10-CM

## 2024-10-21 DIAGNOSIS — M17.10 ARTHRITIS OF KNEE: ICD-10-CM

## 2024-10-21 PROCEDURE — 97110 THERAPEUTIC EXERCISES: CPT | Mod: GP | Performed by: PHYSICAL THERAPIST

## 2024-10-21 NOTE — PROGRESS NOTES
"Physical Therapy    Physical Therapy Treatment    Patient Name: Chelsea Gomez  MRN: [id number]  Today's Date: 10/21/2024  Time Entry:   Time Calculation  Start Time: 0410  Stop Time: 0500  Time Calculation (min): 50 min   Visit: 3  Insurance: Reviewed  Physician: Ortiz Santo    Assessment: Patient with increased pain after doing weighted squats.  Patient advised to avoid squatting, lunges and burpees with workout.  Encouraged patient to do bike or stepper at gym.          Plan:   Continue with POC  SciFit stepper, knee strength/ROM, CKC, PRE's, CP    Current Problem  1. Patellofemoral syndrome of right knee  Follow Up In Physical Therapy      2. Arthritis of knee  Follow Up In Physical Therapy      3. Baker's cyst of knee, left  Follow Up In Physical Therapy        General        Subjective    Pain of right knee is a 6/10 - pain increased after working doing weighted squats 2 weeks ago      Pain       Objective         Treatments:     Stepper x 10 min lvl 3  Lunges on 2nd step x 15 each leg  6\" step ups x 15 each leg  Aeromat SLS 3 x 15 sec Each leg  Leg extension 10# 20X  Leg press 60# 20X   Bridge with medium MB x 20  Theraball KTC with strap 20X   SAQ 4# AW x 20  SLR 2# 2 X 10  (40 minutes)    CP to the right knee - 10 minutes    OP EDUCATION: HEP      Goals:  Active       PT Problem       PT Goal 1       Start:  09/19/24    Expected End:  12/18/24       Knee pain 0/10         PT Goal 2       Start:  09/19/24    Expected End:  12/18/24       Improve LEFS by 20%         PT Goal 3       Start:  09/19/24    Expected End:  12/18/24       Maximize knee ROM          PT Goal 4       Start:  09/19/24    Expected End:  12/18/24       Knee strength 5/5             "

## 2024-10-28 ENCOUNTER — TREATMENT (OUTPATIENT)
Dept: PHYSICAL THERAPY | Facility: CLINIC | Age: 34
End: 2024-10-28
Payer: COMMERCIAL

## 2024-10-28 DIAGNOSIS — M22.2X1 PATELLOFEMORAL SYNDROME OF RIGHT KNEE: ICD-10-CM

## 2024-10-28 DIAGNOSIS — M71.22 BAKER'S CYST OF KNEE, LEFT: ICD-10-CM

## 2024-10-28 DIAGNOSIS — M17.10 ARTHRITIS OF KNEE: ICD-10-CM

## 2024-10-28 PROCEDURE — 97110 THERAPEUTIC EXERCISES: CPT | Mod: GP | Performed by: PHYSICAL THERAPIST

## 2024-11-12 ENCOUNTER — APPOINTMENT (OUTPATIENT)
Dept: PHYSICAL THERAPY | Facility: CLINIC | Age: 34
End: 2024-11-12
Payer: COMMERCIAL

## 2024-11-12 DIAGNOSIS — M22.2X1 PATELLOFEMORAL SYNDROME OF RIGHT KNEE: ICD-10-CM

## 2024-11-12 DIAGNOSIS — M71.22 BAKER'S CYST OF KNEE, LEFT: ICD-10-CM

## 2024-11-12 DIAGNOSIS — M17.10 ARTHRITIS OF KNEE: ICD-10-CM

## 2024-11-12 PROCEDURE — 97110 THERAPEUTIC EXERCISES: CPT | Mod: GP,CQ

## 2024-11-12 ASSESSMENT — PAIN SCALES - GENERAL: PAINLEVEL_OUTOF10: 2

## 2024-11-12 ASSESSMENT — PAIN - FUNCTIONAL ASSESSMENT: PAIN_FUNCTIONAL_ASSESSMENT: 0-10

## 2024-11-12 NOTE — PROGRESS NOTES
Physical Therapy    Physical Therapy Treatment    Patient Name: Chelsea Gomez  MRN: [id number]  Today's Date: 11/12/2024  Time Entry:    Visit: 5/7  Insurance: Reviewed  Physician: Ortiz Santo    Assessment:  Patient is doing well, has show improvements in strength, stability and ROM since starting therapy; although her pain level does fluctuate through out each day. Is independent with her HEP. Reviewed possibility of other visits/breaking from therapy and returning if patient feels it is needed.     Average pain level through entire day is from a 2-4/10 depending on activity  Right knee strength extension 5/5 - flexion 4+/10  Right knee ROM 0-128    Plan:   Continue with POC  SciFit stepper, knee strength/ROM, CKC, PRE's, CP    Current Problem  1. Patellofemoral syndrome of right knee  Follow Up In Physical Therapy      2. Arthritis of knee  Follow Up In Physical Therapy      3. Baker's cyst of knee, left  Follow Up In Physical Therapy        General        Subjective    Minor pain in right knee 2/10  Right heel pain bothering me for past couple weeks  HEP completed daily  No other reported issues/concerns    Pain  Pain Assessment  Pain Assessment: 0-10  0-10 (Numeric) Pain Score: 2    Objective     Treatments:  Therapeutic Exercise  Therapeutic Exercise Performed: Yes  Stepper x 10 min lvl 3  Lunges on 2nd step x 15 each leg  Review of calf/soleus stretching 2 x 30 sec  Up/down reciprocal steps 4# dbs x 5  Aeromat SLS 5 x 15 sec Each leg  Theraband green balance pad mini squats x 20  Leg extension 10# 20X  Matrix ham curls 15# x 20  Leg press 70# 2 X 20  Black TB three way hip x 20    (45 minutes)    OP EDUCATION: HEP      Goals:  Active       PT Problem       PT Goal 1       Start:  09/19/24    Expected End:  12/18/24       Knee pain 0/10         PT Goal 2       Start:  09/19/24    Expected End:  12/18/24       Improve LEFS by 20%         PT Goal 3       Start:  09/19/24    Expected End:  12/18/24        Maximize knee ROM          PT Goal 4       Start:  09/19/24    Expected End:  12/18/24       Knee strength 5/5

## 2024-12-09 DIAGNOSIS — E55.9 VITAMIN D DEFICIENCY: ICD-10-CM

## 2024-12-09 RX ORDER — ACETAMINOPHEN 500 MG
2000 TABLET ORAL DAILY
Qty: 90 CAPSULE | Refills: 1 | Status: SHIPPED | OUTPATIENT
Start: 2024-12-09

## 2025-01-22 ENCOUNTER — PATIENT MESSAGE (OUTPATIENT)
Dept: PRIMARY CARE | Facility: CLINIC | Age: 35
End: 2025-01-22
Payer: COMMERCIAL

## 2025-02-03 ENCOUNTER — DOCUMENTATION (OUTPATIENT)
Dept: PHYSICAL THERAPY | Facility: CLINIC | Age: 35
End: 2025-02-03
Payer: COMMERCIAL

## 2025-02-03 NOTE — PROGRESS NOTES
Physical Therapy    Discharge Summary    Name: Chelsea Gomez  MRN: [id number]  : 1990  Date: 25    Discharge Summary: PT    Discharge Information: Date of discharge 2/3/25, Date of last visit 2/3/25, Date of evaluation 24, Number of attended visits 5, Referred by Ortiz Santo, and Referred for PF syndrome of right knee    Therapy Summary: Patient seen in PT for 5 visits for knee pain secondary to PF syndrome of right knee.  Patient was making progress towards her PT goals.  Patient felt like she was doing well and wished to continue with exercises independently.      Discharge Status: Per patient      Rehab Discharge Reason: Other Patient wished to continue exercises independently

## 2025-02-07 ENCOUNTER — APPOINTMENT (OUTPATIENT)
Dept: PODIATRY | Facility: CLINIC | Age: 35
End: 2025-02-07
Payer: COMMERCIAL

## 2025-02-07 ENCOUNTER — HOSPITAL ENCOUNTER (OUTPATIENT)
Dept: RADIOLOGY | Facility: HOSPITAL | Age: 35
Discharge: HOME | End: 2025-02-07
Payer: COMMERCIAL

## 2025-02-07 ENCOUNTER — TELEPHONE (OUTPATIENT)
Dept: PRIMARY CARE | Facility: CLINIC | Age: 35
End: 2025-02-07

## 2025-02-07 DIAGNOSIS — M72.2 PLANTAR FASCIITIS: Primary | ICD-10-CM

## 2025-02-07 DIAGNOSIS — M79.672 LEFT FOOT PAIN: ICD-10-CM

## 2025-02-07 DIAGNOSIS — M79.671 RIGHT FOOT PAIN: ICD-10-CM

## 2025-02-07 DIAGNOSIS — M72.2 PLANTAR FASCIITIS: ICD-10-CM

## 2025-02-07 PROCEDURE — 73630 X-RAY EXAM OF FOOT: CPT | Mod: RT

## 2025-02-07 PROCEDURE — 73630 X-RAY EXAM OF FOOT: CPT | Mod: LT

## 2025-02-07 PROCEDURE — 99203 OFFICE O/P NEW LOW 30 MIN: CPT | Performed by: PODIATRIST

## 2025-02-07 NOTE — PROGRESS NOTES
CC: right heel pain.     HPI:  Patient seen as new pt with right heel pain for 4 months duration, no acute injury or trauma, has some pain in the am, better with walking, pain after standing, also some left lateral foot pain.    PCP: Nakia Burt CNP  Last visit: 9-16-24     PMH  Past Medical History:   Diagnosis Date    Seizures (Multi) 12/2021    Urinary tract infection 4/01/23     MEDS    Current Outpatient Medications:     cholecalciferol (Vitamin D-3) 50 mcg (2,000 unit) capsule, TAKE 1 CAPSULE (50 MCG) BY MOUTH ONCE DAILY., Disp: 90 capsule, Rfl: 1    folic acid (Folvite) 1 mg tablet, Take 1 tablet (1 mg) by mouth once daily., Disp: 30 tablet, Rfl: 11    multivitamin tablet, Take 1 tablet by mouth once daily., Disp: , Rfl:     nasal spray Nayzilam 5 mg/spray (0.1 mL) spray,non-aerosol, PLEASE SEE ATTACHED FOR DETAILED DIRECTIONS, Disp: , Rfl:     levETIRAcetam XR (Keppra XR) 500 mg 24 hr tablet, Take 4 tablets (2,000 mg) by mouth once daily., Disp: 120 tablet, Rfl: 2  Allergies  No Known Allergies  Social History     Socioeconomic History    Marital status: Single   Tobacco Use    Smoking status: Never     Passive exposure: Never    Smokeless tobacco: Never   Substance and Sexual Activity    Alcohol use: Never    Drug use: Never    Sexual activity: Yes     Partners: Male     Birth control/protection: None     Social Drivers of Health     Financial Resource Strain: Low Risk  (6/3/2024)    Overall Financial Resource Strain (CARDIA)     Difficulty of Paying Living Expenses: Not very hard   Transportation Needs: No Transportation Needs (6/3/2024)    PRAPARE - Transportation     Lack of Transportation (Medical): No     Lack of Transportation (Non-Medical): No   Housing Stability: Low Risk  (6/3/2024)    Housing Stability Vital Sign     Unable to Pay for Housing in the Last Year: No     Number of Places Lived in the Last Year: 1     Unstable Housing in the Last Year: No     Family History   Problem Relation Name  Age of Onset    Hypertension Mother KS     Depression Mother KS     Hypertension Father JS     Diabetes Maternal Grandfather WMS     Alcohol abuse Sister TFS     Depression Sister TFS      Past Surgical History:   Procedure Laterality Date    OTHER SURGICAL HISTORY  08/16/2022    No history of surgery    WISDOM TOOTH EXTRACTION         REVIEW OF SYSTEMS    Musculoskeletal: + as noted in HPI.       Physical examination:   On General Observation: Patient is a pleasant, cooperative, well developed 34 y.o.  adult female. The patient is alert and oriented to time, place and person. Patient has normal affect and mood.  There were no vitals taken for this visit.    Vascular:  DP and PT pulses are 2/4 b/l.  Mild edema to noted to right heel.      Muscular: Strength is 5/5 for all instrinsic and extrinsic muscle groups with exception of ankle dorsiflexion and plantarflexion d/t guarding.  Pain with palpation to the plantar medial aspect of the right heel at the insertion of the plantar medial calcaneal tubercle.  No signs of calcaneal stress fracture.      Neuro:   Light touch present bilateral.     Derm:   Skin texture and turgor within normal limits.     No rashes, subcutaneous nodules, or open lesions noted.  No hyperkeratotic tissue. No erythema    Ortho:  Pain is present to the medial band of the plantar fascia right foot, slight pain left dorsal lateral left foot, rom is stable 1st mpj, mtj, stj, there is some decrease with AJ DF with the knee flexed and extended.    ASSESSMENT:    Plantar fasciitis right foot  Pain right foot  Pain left foot       PLAN:   Consult  A comprehensive history and physical examination were preformed. The patient was educated on clinical findings, diagnosis and treatment plans. Patient understands all that has been explained and all questions were answered to apparent satisfaction.     - We discussed the etiology of the heel complaints as well as the plantar fasciitis treatment  protocol.  -Reviewed treatment options including formal Pt, injection therapy, orthotics, night splints.  - Pt to to begin stretching, icing,  and wearing walking boot/appropriate shoes.   -Will obtain xrays of both feet  - Patient given instructions to start PT.  Will need to address equinus as well.  -  Consider custom inserts at next visit pending insurance coverage.   - Handout given to pt.   - Follow up in 2 weeks,      Cristhian Brandon DPM

## 2025-02-07 NOTE — TELEPHONE ENCOUNTER
----- Message from Cristhian Brandon sent at 2/7/2025  9:53 AM EST -----  Regarding: orthotics  Please check orthotic benefits dx is m72.2 thanks.

## 2025-02-07 NOTE — TELEPHONE ENCOUNTER
[facility]  [phone]  PER: ALETHA HOLLY  EFFECTIVE 1/1/24  CPT  X2  DX M72.2 ARE VALID AND BILLABLE, BASED ON MEDICAL NECESSITY  PLAN PAYS % OF ALLOWED AMT  NO COPAY  NO DEDUCTIBLE  PLAN ALLOWS 1 PAIR EVERY 2 YEARS (731 DAYS)  NO PRIOR AUTHORIZATION IS NEEDED  REFERENCE #[id number]    LM FOR PATIENT WITH THE ABOVE INFO. ASKED HER TO CALL THE OFFICE TO SCHEDULE. THANK YOU!

## 2025-02-21 ENCOUNTER — APPOINTMENT (OUTPATIENT)
Dept: PODIATRY | Facility: CLINIC | Age: 35
End: 2025-02-21
Payer: COMMERCIAL

## 2025-02-24 ENCOUNTER — APPOINTMENT (OUTPATIENT)
Dept: PODIATRY | Facility: CLINIC | Age: 35
End: 2025-02-24
Payer: COMMERCIAL

## 2025-02-24 VITALS — WEIGHT: 293 LBS | BODY MASS INDEX: 45.99 KG/M2 | HEIGHT: 67 IN

## 2025-02-24 DIAGNOSIS — M79.672 LEFT FOOT PAIN: ICD-10-CM

## 2025-02-24 DIAGNOSIS — M72.2 PLANTAR FASCIITIS: Primary | ICD-10-CM

## 2025-02-24 DIAGNOSIS — M77.31 HEEL SPUR, RIGHT: ICD-10-CM

## 2025-02-24 DIAGNOSIS — M79.671 RIGHT FOOT PAIN: ICD-10-CM

## 2025-02-24 PROCEDURE — L3020 FOOT LONGITUD/METATARSAL SUP: HCPCS | Performed by: PODIATRIST

## 2025-02-24 PROCEDURE — 99212 OFFICE O/P EST SF 10 MIN: CPT | Performed by: PODIATRIST

## 2025-02-24 PROCEDURE — 3008F BODY MASS INDEX DOCD: CPT | Performed by: PODIATRIST

## 2025-02-24 PROCEDURE — 1036F TOBACCO NON-USER: CPT | Performed by: PODIATRIST

## 2025-02-24 NOTE — PROGRESS NOTES
CC: right heel pain.      HPI:  Patient seen follow up heel pain, doing the hep and price, had the xrays done, still pain present, here to be also casted for orthotics.     PCP: Nakia Burt CNP  Last visit: 9-16-24      PMH  Medical History        Past Medical History:   Diagnosis Date    Seizures (Multi) 12/2021    Urinary tract infection 4/01/23         MEDS    Current Medications      Current Outpatient Medications:     cholecalciferol (Vitamin D-3) 50 mcg (2,000 unit) capsule, TAKE 1 CAPSULE (50 MCG) BY MOUTH ONCE DAILY., Disp: 90 capsule, Rfl: 1    folic acid (Folvite) 1 mg tablet, Take 1 tablet (1 mg) by mouth once daily., Disp: 30 tablet, Rfl: 11    multivitamin tablet, Take 1 tablet by mouth once daily., Disp: , Rfl:     nasal spray Nayzilam 5 mg/spray (0.1 mL) spray,non-aerosol, PLEASE SEE ATTACHED FOR DETAILED DIRECTIONS, Disp: , Rfl:     levETIRAcetam XR (Keppra XR) 500 mg 24 hr tablet, Take 4 tablets (2,000 mg) by mouth once daily., Disp: 120 tablet, Rfl: 2     Allergies  Allergies   No Known Allergies     Social History   Social History            Socioeconomic History    Marital status: Single   Tobacco Use    Smoking status: Never       Passive exposure: Never    Smokeless tobacco: Never   Substance and Sexual Activity    Alcohol use: Never    Drug use: Never    Sexual activity: Yes       Partners: Male       Birth control/protection: None      Social Drivers of Health           Financial Resource Strain: Low Risk  (6/3/2024)     Overall Financial Resource Strain (CARDIA)      Difficulty of Paying Living Expenses: Not very hard   Transportation Needs: No Transportation Needs (6/3/2024)     PRAPARE - Transportation      Lack of Transportation (Medical): No      Lack of Transportation (Non-Medical): No   Housing Stability: Low Risk  (6/3/2024)     Housing Stability Vital Sign      Unable to Pay for Housing in the Last Year: No      Number of Places Lived in the Last Year: 1      Unstable Housing in  the Last Year: No         Family History          Family History   Problem Relation Name Age of Onset    Hypertension Mother KS      Depression Mother KS      Hypertension Father JS      Diabetes Maternal Grandfather WMS      Alcohol abuse Sister TFS      Depression Sister TFS           Surgical History         Past Surgical History:   Procedure Laterality Date    OTHER SURGICAL HISTORY   08/16/2022     No history of surgery    WISDOM TOOTH EXTRACTION                REVIEW OF SYSTEMS     Musculoskeletal: + as noted in HPI.         Physical examination:   On General Observation: Patient is a pleasant, cooperative, well developed 34 y.o.  adult female. The patient is alert and oriented to time, place and person. Patient has normal affect and mood.  There were no vitals taken for this visit.     Vascular:  DP and PT pulses are 2/4 b/l.  Mild edema to noted to right heel.       Muscular: Strength is 5/5 for all instrinsic and extrinsic muscle groups with exception of ankle dorsiflexion and plantarflexion d/t guarding.  Pain with palpation to the plantar medial aspect of the right heel at the insertion of the plantar medial calcaneal tubercle.  No signs of calcaneal stress fracture.       Neuro:   Light touch present bilateral.      Derm:   Skin texture and turgor within normal limits.     No rashes, subcutaneous nodules, or open lesions noted.  No hyperkeratotic tissue. No erythema     Ortho:  Pain is present to the medial band of the plantar fascia right foot, slight pain left dorsal lateral left foot, rom is stable 1st mpj, mtj, stj, there is some decrease with AJ DF with the knee flexed and extended.     ASSESSMENT:    Plantar fasciitis right foot  Heel Spur right  Pain right foot  Pain left foot         PLAN:   Exam  Reviewed with pt, pain is still present, reviewed the xrays heel spur is present right  Reviewed treatment options including PT, injection therapy, will continue the price and ehp, will rx for therapy,  casted for custom orthotics biofoam sport, call when arrives.        Cristhian Brandon DPM

## 2025-02-25 ENCOUNTER — APPOINTMENT (OUTPATIENT)
Dept: RADIOLOGY | Facility: HOSPITAL | Age: 35
End: 2025-02-25
Payer: COMMERCIAL

## 2025-02-25 ENCOUNTER — HOSPITAL ENCOUNTER (EMERGENCY)
Facility: HOSPITAL | Age: 35
Discharge: HOME | End: 2025-02-25
Payer: COMMERCIAL

## 2025-02-25 VITALS
WEIGHT: 293 LBS | HEART RATE: 72 BPM | BODY MASS INDEX: 48.71 KG/M2 | RESPIRATION RATE: 18 BRPM | SYSTOLIC BLOOD PRESSURE: 114 MMHG | DIASTOLIC BLOOD PRESSURE: 87 MMHG | TEMPERATURE: 97.8 F | OXYGEN SATURATION: 99 %

## 2025-02-25 DIAGNOSIS — S69.92XA INJURY OF LEFT WRIST, INITIAL ENCOUNTER: Primary | ICD-10-CM

## 2025-02-25 PROCEDURE — 73110 X-RAY EXAM OF WRIST: CPT | Mod: LEFT SIDE | Performed by: RADIOLOGY

## 2025-02-25 PROCEDURE — 73110 X-RAY EXAM OF WRIST: CPT | Mod: LT

## 2025-02-25 PROCEDURE — 99283 EMERGENCY DEPT VISIT LOW MDM: CPT

## 2025-02-25 RX ORDER — BACITRACIN ZINC 500 UNIT/G
OINTMENT IN PACKET (EA) TOPICAL ONCE
Status: DISCONTINUED | OUTPATIENT
Start: 2025-02-25 | End: 2025-02-25 | Stop reason: HOSPADM

## 2025-02-25 ASSESSMENT — PAIN SCALES - GENERAL: PAINLEVEL_OUTOF10: 7

## 2025-02-25 ASSESSMENT — PAIN DESCRIPTION - PAIN TYPE: TYPE: ACUTE PAIN

## 2025-02-25 ASSESSMENT — PAIN - FUNCTIONAL ASSESSMENT: PAIN_FUNCTIONAL_ASSESSMENT: 0-10

## 2025-02-25 ASSESSMENT — PAIN DESCRIPTION - LOCATION: LOCATION: HAND

## 2025-02-25 NOTE — ED PROVIDER NOTES
HPI   Chief Complaint   Patient presents with    Fall       35-year-old female presents to the ED today with a chief concern of left wrist injury.  Patient reports that she was moving something when she simply tripped and fell and landed on some wood chips and at a playground.  She did not hit her head or lose consciousness.  Did not sustain any other injuries.  Reports that she noticed a scratch on her left hand and went to get this evaluated.  Denies numbness or tingling or weakness.  Denies fevers.  Denies nausea or vomiting.  No bleeding or clotting disorders.  Is not anticoagulated.  She has no other symptoms or concerns at this time.      History provided by:  Patient   used: No            Patient History   Past Medical History:   Diagnosis Date    Seizures (Multi) 12/2021    Urinary tract infection 4/01/23     Past Surgical History:   Procedure Laterality Date    OTHER SURGICAL HISTORY  08/16/2022    No history of surgery    WISDOM TOOTH EXTRACTION       Family History   Problem Relation Name Age of Onset    Hypertension Mother KS     Depression Mother KS     Hypertension Father JS     Diabetes Maternal Grandfather WMS     Alcohol abuse Sister TFS     Depression Sister TFS      Social History     Tobacco Use    Smoking status: Never     Passive exposure: Never    Smokeless tobacco: Never   Substance Use Topics    Alcohol use: Never    Drug use: Never       Physical Exam   ED Triage Vitals [02/25/25 1327]   Temperature Heart Rate Respirations BP   36.6 °C (97.8 °F) 72 18 114/87      Pulse Ox Temp Source Heart Rate Source Patient Position   99 % Temporal Monitor --      BP Location FiO2 (%)     -- --       Physical Exam  Constitutional: Vital signs per nursing notes.  Well developed, well nourished.  No acute distress.    Eyes:  conjunctivae and lids normal  ENT: Ears normal externally; face symmetric. voice normal  Neck: neck supple, no meningismus; trachea midline without  deviation  Respiratory: normal respiratory effort and excursion; no rales, rhonchi, or wheezes; equal air entry  Cardiovascular: RRR, 2+ pulses extremities   Neurological: normal speech; CN II-XII grossly intact, normal motor and sensory function; no nystagmus; ; no ataxia.  GI: no distention, soft, nontender  : Deferred  Musculoskeletal: normal gait and station; normal digits and nails; range of motion of the left wrist with no focal bony tenderness to palpation.  There is mild tenderness over the left hypothenar eminence.  No tenderness over the left anatomic snuffbox.  5/5 strength in left hand and wrist.  Sensation intact in hands and wrist bilaterally.  Compartments are soft.  No sinuses.  2+ symmetric radial pulses.    Skin: Abrasion noted to the palmar aspect of the left hand.  There is no surrounding erythema or streaking.  No active bleeding.      ED Course & MDM   ED Course as of 02/27/25 0742   Tue Feb 25, 2025   1350 I personally interpreted x-ray of the left hand.  X-ray shows no fracture.  Final disposition and treatment pending radiology read [MC]   1404 FINDINGS:      No acute fracture or dislocation seen. No soft tissue swelling is  identified. There is no evidence for periosteal reaction or bony  erosion. No radiopaque foreign body seen.      IMPRESSION:  No acute fracture or dislocation.          MACRO:  None      Signed by: Gloria Roman 2/25/2025 1:59 PM  Dictation workstation:   [id number]   []   1618 Patient was placed in a thumb spica left wrist brace by nursing staff.  I evaluated this after placement neurovascular intact. []      ED Course User Index  [] Matias Lara PA-C         Diagnoses as of 02/27/25 0742   Injury of left wrist, initial encounter                 No data recorded                                 Medical Decision Making  35-year-old female presents to the ED today with a chief concern of left hand pain.Patient has full range of motion the neck without  meningismus.  Satting well on room air.  Hypoxic.  Not tachycardic.  Afebrile.  I did order another tetanus however patient declined since she had one in 2016.  Her tetanus is up-to-date.    The wound was thoroughly irrigated with sterile water and Hibiclens.  Wound appears to be more like an abrasion.  There is no gaping.  There is no indication for sutures at this time.  I used point care ultrasound area to make sure there was no foreign body.  I did not note any foreign body on my exam.  She denies any retained foreign body sensation.  Her x-ray shows no fracture.  X-ray shows no retained foreign body as well.  Neurovascular status intact in upper extremities bilaterally.  She has no signs of compartment syndrome.  Compartments are soft.  She has no tenderness over the anatomic snuffbox.  Not concern for any occult scaphoid fracture at this time.  Patient was placed in a thumb spica wrist brace by nursing staff.  I evaluated this after placement neurovascular test intact.  The injury was mechanical.  Did not have any symptoms prior to the injury.  Discussed my impression and findings with patient she feels comfortable returning home.  We discussed very strict return precautions including returning for any new or worsening signs or symptoms.  Patient is in agreement with this plan.  She will follow-up with her PCP and orthopedics within 3 days.    Differential diagnosis: Fracture, strain, sprain, compartment syndrome, occult fracture, laceration, abrasion, retained foreign body    Disposition/treatment  1.  See diagnosis    Shared decision-making was used patient feels comfortable returning home     Patient was advised to follow up with recommended provider in 1 day for another evaluation and exam. I advised patient/guardian to return or go to closest emergency room immediately if symptoms change, get worse, new symptoms develop prior to follow up. If there is no improvement in symptoms in the next 24 hours they  are advised to return for further evaluation and exam. I also explained the plan and treatment course. Patient/guardian is in agreement with plan, treatment course, and follow up and states verbally that they will comply.    Patient is homegoing. I discussed the differential; results and discharge plan with the patient and/or family/friend/caregiver if present.  I emphasized the importance of follow-up with the physician I referred them to in the timeframe recommended.  I explained reasons for the patient to return to the Emergency Department. They agreed that if they feel their condition is worsening or if they have any other concern they should call 911 immediately for further assistance. I gave the patient an opportunity to ask all questions they had and answered all of them accordingly. They understand return precautions and discharge instructions. The patient and/or family/friend/caregiver expressed understanding verbally and that they would comply.        This note has been transcribed using voice recognition and may contain grammatical errors, misplaced words, incorrect words, incorrect phrases or other errors.        Procedure  Procedures     Matias Lara PA-C  02/27/25 0744

## 2025-03-10 DIAGNOSIS — R56.9 SEIZURE (MULTI): ICD-10-CM

## 2025-03-10 RX ORDER — LEVETIRACETAM 500 MG/1
2000 TABLET, EXTENDED RELEASE ORAL DAILY
Qty: 120 TABLET | Refills: 11 | Status: SHIPPED | OUTPATIENT
Start: 2025-03-10 | End: 2026-03-05

## 2025-03-17 ENCOUNTER — APPOINTMENT (OUTPATIENT)
Dept: ORTHOPEDIC SURGERY | Facility: CLINIC | Age: 35
End: 2025-03-17
Payer: COMMERCIAL

## 2025-03-17 DIAGNOSIS — S69.92XA INJURY OF LEFT WRIST, INITIAL ENCOUNTER: ICD-10-CM

## 2025-03-17 PROCEDURE — L3809 WHFO W/O JOINTS PRE OTS: HCPCS | Performed by: ORTHOPAEDIC SURGERY

## 2025-03-17 PROCEDURE — 99203 OFFICE O/P NEW LOW 30 MIN: CPT | Performed by: ORTHOPAEDIC SURGERY

## 2025-03-17 NOTE — PROGRESS NOTES
Chelsea Gomez is a 35 year-old female with no significant PMHx presents 2 weeks after FOOSH injury to left wrist. She reports mild persistent pain of the anterior wrist in the region of the healing laceration. Described mostly as pressure, with tenderness to palpation. No radiation of pain. No NTW. She was seen in the ED day of injury, XR and US without any significant acute findings. Laceration did not require sutures, no antibiotics given. Denies fever, chills, warmth, redness.     Hand/Wrist Musculoskeletal Exam    Inspection    Left      Erythema: none      Ecchymosis: none      Edema severity: small area on palmar wrist with darkening of skin and edema.      Deformity: none    Palpation    Left       Left hand palpation is normal.      Wrist tenderness to palpation: hook of hamate      Wrist tenderness to palpation comment: additional TTP at palmar wrist around area of soft tissue injury directly overlaps hamate    Range of Motion     Left Hand      Left hand range of motion is normal.        Left Wrist      Left wrist range of motion is normal.       Active flexion: reproducible pressure/pain at palmar wrist.      Strength     Left Hand      Left hand strength is normal.       Left Wrist      Left wrist strength is normal.      Neurovascular    Left       Left neurovascular exam is normal.    Special Tests    Left      CMC grind test: negative      Assessment  soft tissue injury with questionable underlying hook of hamate fracture     Plan  -reviewed XR and discussed findings with patient   -thumb spica splint for 2 weeks   -discussed getting CT scan in 2 weeks if pain is not improved      Piper Wilson DO, PGY1

## 2025-03-27 ENCOUNTER — TELEPHONE (OUTPATIENT)
Dept: PRIMARY CARE | Facility: CLINIC | Age: 35
End: 2025-03-27
Payer: COMMERCIAL

## 2025-03-27 NOTE — TELEPHONE ENCOUNTER
Your orthotics are in.  Please schedule an appointment at your earliest convenience with Dr Brandon to get your new inserts.  You can call [phone] or schedule through [employer].   Thank you.

## 2025-03-31 ENCOUNTER — APPOINTMENT (OUTPATIENT)
Dept: PRIMARY CARE | Facility: CLINIC | Age: 35
End: 2025-03-31
Payer: COMMERCIAL

## 2025-03-31 VITALS
WEIGHT: 293 LBS | BODY MASS INDEX: 45.99 KG/M2 | SYSTOLIC BLOOD PRESSURE: 120 MMHG | DIASTOLIC BLOOD PRESSURE: 70 MMHG | HEIGHT: 67 IN

## 2025-03-31 DIAGNOSIS — E78.00 HYPERCHOLESTEREMIA: ICD-10-CM

## 2025-03-31 DIAGNOSIS — S69.92XD INJURY OF LEFT WRIST, SUBSEQUENT ENCOUNTER: Primary | ICD-10-CM

## 2025-03-31 DIAGNOSIS — R63.5 WEIGHT GAIN: ICD-10-CM

## 2025-03-31 DIAGNOSIS — R56.9 SEIZURE (MULTI): ICD-10-CM

## 2025-03-31 DIAGNOSIS — E66.01 MORBID OBESITY (MULTI): ICD-10-CM

## 2025-03-31 DIAGNOSIS — E55.9 VITAMIN D DEFICIENCY: ICD-10-CM

## 2025-03-31 PROBLEM — R87.612 LOW GRADE SQUAMOUS INTRAEPITHELIAL LESION (LGSIL) ON PAPANICOLAOU SMEAR OF CERVIX: Status: ACTIVE | Noted: 2025-03-31

## 2025-03-31 PROBLEM — R41.82 ALTERED MENTAL STATUS: Status: RESOLVED | Noted: 2025-03-31 | Resolved: 2025-03-31

## 2025-03-31 PROBLEM — S01.81XA LACERATION OF FACE: Status: RESOLVED | Noted: 2025-03-31 | Resolved: 2025-03-31

## 2025-03-31 PROBLEM — L83 ACANTHOSIS NIGRICANS: Status: ACTIVE | Noted: 2025-03-31

## 2025-03-31 PROBLEM — M54.9 BACK PAIN: Status: RESOLVED | Noted: 2025-03-31 | Resolved: 2025-03-31

## 2025-03-31 PROCEDURE — 99204 OFFICE O/P NEW MOD 45 MIN: CPT | Performed by: INTERNAL MEDICINE

## 2025-03-31 PROCEDURE — 1036F TOBACCO NON-USER: CPT | Performed by: INTERNAL MEDICINE

## 2025-03-31 PROCEDURE — 3008F BODY MASS INDEX DOCD: CPT | Performed by: INTERNAL MEDICINE

## 2025-03-31 RX ORDER — FOLIC ACID 1 MG/1
1 TABLET ORAL DAILY
COMMUNITY

## 2025-03-31 ASSESSMENT — PATIENT HEALTH QUESTIONNAIRE - PHQ9
1. LITTLE INTEREST OR PLEASURE IN DOING THINGS: NOT AT ALL
SUM OF ALL RESPONSES TO PHQ9 QUESTIONS 1 AND 2: 0
2. FEELING DOWN, DEPRESSED OR HOPELESS: NOT AT ALL

## 2025-03-31 NOTE — PROGRESS NOTES
"Subjective   Patient ID: Chelsea Gomez is a 35 y.o. female who presents for Establish Care.    HPI   35-year-old -American female comes to establish primary care as her previous provider has relocated.  Patient has history of seizure disorder for which she is on Keppra and for which she is following up with neurology.  She did have an annual wellness exam done by her previous provider in September 2024.  Labs done last year were reviewed and discussed with patient.  She is compliant with Keppra and has not had a seizure since early June 2024.  Patient complains of weight gain but she declines any drug therapy to aid in weight loss currently and will try to watch her diet.  Patient fell on frozen mulch at a  where she works in February 2025 and she was subsequently evaluated in the ED after sustaining injury to the left wrist.  X-ray done at the time did not reveal any fracture or dislocation and patient declines any significant pain at the site or limitation of mobility.  There is a wound on the flexor aspect of her left wrist which has been healing.  No history of fever, chills, chest pain, shortness of breath, cough, dizziness, palpitations, syncope, GI or  symptoms.  No headaches, weakness or numbness reported.  Review of Systems  As per HPI.  Objective   /70 (BP Location: Right arm, Patient Position: Sitting, BP Cuff Size: Thigh)   Ht 1.702 m (5' 7\")   Wt 147 kg (324 lb)   BMI 50.75 kg/m²     Physical Exam  General - well developed, well appearing, morbidly obese, young -American female in no acute respiratory distress  Eyes - normal conjunctiva with no pallor or icterus, normal extraocular movements  ENT - normal external auditory canals and tympanic membranes, throat clear with no exudates  Neck - No JVD, thyromegaly or lymphadenopathy  Lungs - no respiratory distress and lungs clear to auscultation bilaterally with no rales or wheezes  Heart - normal S1, S2 with normal " heart rate, rhythm and no murmurs   Breasts, pelvic and pap - per gyn  Abdomen -  soft, nontender with no masses or organomegaly  Extremities - no cyanosis or pedal edema  Neuro - grossly normal neuro exam with no focal neuro deficits  Psych - normal mental status, mood and affect   Skin -left wrist wound with induration/scaling, no purulence noted the site and no open areas or discharge, range of motion of left wrist normal  MSK - normal gait with grossly normal ROM of major joints  Assessment/Plan     1.  Accidental fall while at work and late February 2025 with injury to left wrist-wound is healing well, no evidence of fracture or dislocation  2.-Patient is aware of the need to watch her diet and exercise regularly  3.  Vitamin D deficiency-improved, last level was normal  4.  Hypercholesterolemia-patient has been advised to watch her diet, lipids will be checked during annual wellness exam at follow-up  5.  Seizure disorder-stable, patient is on Keppra and she will follow-up with neurology as recommended  Follow-up in 6 months for annual wellness exam and lab work.  45 minutes spent rooming the patient, reviewing records, eliciting history, examining patient, counseling, coordination of care and in documentation.  This note was partially generated using the Dragon voice recognition system. There may be some incorrect words, spelling and punctuation errors that were not corrected prior to committing the note to the patient's medical record.

## 2025-04-03 ENCOUNTER — APPOINTMENT (OUTPATIENT)
Dept: PODIATRY | Facility: CLINIC | Age: 35
End: 2025-04-03
Payer: COMMERCIAL

## 2025-04-06 DIAGNOSIS — R56.9 SEIZURE (MULTI): ICD-10-CM

## 2025-04-07 RX ORDER — LEVETIRACETAM 500 MG/1
2000 TABLET, EXTENDED RELEASE ORAL DAILY
Qty: 120 TABLET | Refills: 2 | Status: SHIPPED | OUTPATIENT
Start: 2025-04-07 | End: 2026-04-02

## 2025-04-10 ENCOUNTER — APPOINTMENT (OUTPATIENT)
Dept: PODIATRY | Facility: CLINIC | Age: 35
End: 2025-04-10
Payer: COMMERCIAL

## 2025-04-10 DIAGNOSIS — M72.2 PLANTAR FASCIITIS: Primary | ICD-10-CM

## 2025-04-10 DIAGNOSIS — M77.31 HEEL SPUR, RIGHT: ICD-10-CM

## 2025-04-10 PROCEDURE — 1036F TOBACCO NON-USER: CPT | Performed by: PODIATRIST

## 2025-04-10 PROCEDURE — 99212 OFFICE O/P EST SF 10 MIN: CPT | Performed by: PODIATRIST

## 2025-04-10 NOTE — PROGRESS NOTES
CC: right heel pain.      HPI:  Patient seen to  orthotics, will be starting therapy next week.     PCP: Nakia Burt CNP  Last visit: 9-16-24      PMH  Medical History           Past Medical History:   Diagnosis Date    Seizures (Multi) 12/2021    Urinary tract infection 4/01/23         MEDS     Current Medications      Current Outpatient Medications:     cholecalciferol (Vitamin D-3) 50 mcg (2,000 unit) capsule, TAKE 1 CAPSULE (50 MCG) BY MOUTH ONCE DAILY., Disp: 90 capsule, Rfl: 1    folic acid (Folvite) 1 mg tablet, Take 1 tablet (1 mg) by mouth once daily., Disp: 30 tablet, Rfl: 11    multivitamin tablet, Take 1 tablet by mouth once daily., Disp: , Rfl:     nasal spray Nayzilam 5 mg/spray (0.1 mL) spray,non-aerosol, PLEASE SEE ATTACHED FOR DETAILED DIRECTIONS, Disp: , Rfl:     levETIRAcetam XR (Keppra XR) 500 mg 24 hr tablet, Take 4 tablets (2,000 mg) by mouth once daily., Disp: 120 tablet, Rfl: 2      Allergies  Allergies   No Known Allergies      Social History   Social History                Socioeconomic History    Marital status: Single   Tobacco Use    Smoking status: Never       Passive exposure: Never    Smokeless tobacco: Never   Substance and Sexual Activity    Alcohol use: Never    Drug use: Never    Sexual activity: Yes       Partners: Male       Birth control/protection: None      Social Drivers of Health              Financial Resource Strain: Low Risk  (6/3/2024)     Overall Financial Resource Strain (CARDIA)      Difficulty of Paying Living Expenses: Not very hard   Transportation Needs: No Transportation Needs (6/3/2024)     PRAPARE - Transportation      Lack of Transportation (Medical): No      Lack of Transportation (Non-Medical): No   Housing Stability: Low Risk  (6/3/2024)     Housing Stability Vital Sign      Unable to Pay for Housing in the Last Year: No      Number of Places Lived in the Last Year: 1      Unstable Housing in the Last Year: No         Family History                Family History   Problem Relation Name Age of Onset    Hypertension Mother KS      Depression Mother KS      Hypertension Father JS      Diabetes Maternal Grandfather WMS      Alcohol abuse Sister TFS      Depression Sister TFS           Surgical History             Past Surgical History:   Procedure Laterality Date    OTHER SURGICAL HISTORY   08/16/2022     No history of surgery    WISDOM TOOTH EXTRACTION                REVIEW OF SYSTEMS     Musculoskeletal: + as noted in HPI.         Physical examination:   On General Observation: Patient is a pleasant, cooperative, well developed 34 y.o.  adult female. The patient is alert and oriented to time, place and person. Patient has normal affect and mood.  There were no vitals taken for this visit.     Vascular:  DP and PT pulses are 2/4 b/l.  Mild edema to noted to right heel.       Muscular: Strength is 5/5 for all instrinsic and extrinsic muscle groups with exception of ankle dorsiflexion and plantarflexion d/t guarding.  Pain with palpation to the plantar medial aspect of the right heel at the insertion of the plantar medial calcaneal tubercle.  No signs of calcaneal stress fracture.       Neuro:   Light touch present bilateral.      Derm:   Skin texture and turgor within normal limits.     No rashes, subcutaneous nodules, or open lesions noted.  No hyperkeratotic tissue. No erythema     Ortho:  Pain is present to the medial band of the plantar fascia right foot, slight pain left dorsal lateral left foot, rom is stable 1st mpj, mtj, stj, there is some decrease with AJ DF with the knee flexed and extended.     ASSESSMENT:    Plantar fasciitis right foot  Heel Spur right  Pain right foot  Pain left foot         PLAN:   Exam  Dispensed orthotics with oral and written instructions, follow up 4 weeks if any issues.        Cristhian Brandon DPM

## 2025-04-15 ENCOUNTER — APPOINTMENT (OUTPATIENT)
Dept: PHYSICAL THERAPY | Facility: CLINIC | Age: 35
End: 2025-04-15
Payer: COMMERCIAL

## 2025-04-24 ENCOUNTER — EVALUATION (OUTPATIENT)
Dept: PHYSICAL THERAPY | Facility: CLINIC | Age: 35
End: 2025-04-24
Payer: COMMERCIAL

## 2025-04-24 DIAGNOSIS — M77.31 HEEL SPUR, RIGHT: ICD-10-CM

## 2025-04-24 DIAGNOSIS — M79.671 RIGHT FOOT PAIN: ICD-10-CM

## 2025-04-24 DIAGNOSIS — M79.672 LEFT FOOT PAIN: ICD-10-CM

## 2025-04-24 DIAGNOSIS — M72.2 PLANTAR FASCIITIS: ICD-10-CM

## 2025-04-24 PROCEDURE — 97110 THERAPEUTIC EXERCISES: CPT | Mod: GP | Performed by: PHYSICAL THERAPIST

## 2025-04-24 PROCEDURE — 97161 PT EVAL LOW COMPLEX 20 MIN: CPT | Mod: GP | Performed by: PHYSICAL THERAPIST

## 2025-04-24 ASSESSMENT — ENCOUNTER SYMPTOMS
DEPRESSION: 0
LOSS OF SENSATION IN FEET: 0
OCCASIONAL FEELINGS OF UNSTEADINESS: 1

## 2025-04-24 ASSESSMENT — PAIN - FUNCTIONAL ASSESSMENT: PAIN_FUNCTIONAL_ASSESSMENT: 0-10

## 2025-04-24 ASSESSMENT — PAIN SCALES - GENERAL: PAINLEVEL_OUTOF10: 5 - MODERATE PAIN

## 2025-04-24 NOTE — PROGRESS NOTES
Physical Therapy  Physical Therapy Orthopedic Evaluation    Patient Name: Chelsea Gomez  MRN: [id number]  Today's Date: 4/24/2025  Time Calculation  Start Time: 1405  Stop Time: 1450  Time Calculation (min): 45 min    Insurance:  Visit number: 1 of 30  Authorization info: Auth after 8 visits  Insurance Type: [facility]    General:  Reason for visit: BL (R>L) foot plantar fascitis/heel spur  Referred by: Dr. Brandon      Current Problem:  1. Plantar fasciitis  Referral to Physical Therapy      2. Heel spur, right  Referral to Physical Therapy      3. Right foot pain  Referral to Physical Therapy      4. Left foot pain  Referral to Physical Therapy          Precautions: seizure disorder (most recent seizure being June 2024)  Precautions  STEADI Fall Risk Score (The score of 4 or more indicates an increased risk of falling): 4    Medical History Form: Reviewed (scanned into chart)    Subjective:     Chief Complaint: Patient presents to clinic with BL foot pain. Reports she began gaining weight after starting a seizure medication, and since then, has noticed an increase in her foot pain. She had a fall last year- slipped on wet grass at a concert (was seen in PT for her knee which improved), but this further worsened her foot pain. She was seen by a podiatrist where she underwent xrays and was told she has a heel spur and plantar fascitis, and was referred to get orthotics and attend Physical Therapy. Since receiving her orthotics, her symptoms have improved somewhat.   Onset Date: 1/1/2023  MADINA: Chronic    Current Condition:   Better with orthotics     Pain:  Pain Assessment: 0-10  0-10 (Numeric) Pain Score: 5 - Moderate pain, can increase to 10/10 with walking, standing  Location: plantar aspect of BL feet  Description: achy and sharp pain  Aggravating Factors: Standing and Walking, prolonged sitting  Relieving Factors:  orthotics     Relevant Information (PMH & Previous Tests/Imaging):   Xray  "2/8/2025:  IMPRESSION:  Small plantar calcaneal enthesophyte.  Previous Interventions/Treatments: orthotics     % of Prior Level of Function (PLOF): 75%  Patient previously independent with all ADLs, currently has difficulty with walking, standing, and exercise  Exercise/Physical Activity: working on getting back into exercise as she just got her orthotics   Work/School:     Patients Living Environment: Reviewed and no concern    Primary Language: English    There are no spiritual/cultural practices/values/needs that are important to know    Patient's Goal(s) for Therapy: \"alleviate the pain\"    Red Flags: Do you have any of the following? No  Fever/chills, unexplained weight changes, dizziness/fainting, unexplained change in bowel or bladder functions, unexplained malaise or muscle weakness, night pain/sweats, numbness or tingling    Objective:  Objective       ROM       Knee AROM (Degrees)  WNL      Ankle AROM (Degrees)      (R)  (L)  Plantarflexion: 45  55    Dorsiflexion: 9  0    Inversion: 40  35     Eversion: 9  17              Strength Testing    Ankle    (R)  (L)  Plantarflexion: 4+/5  4+/5      Dorsiflexion: 4+/5  4+/5    Inversion: 4/5  4/5     Eversion: 4+/5  4+/5         Palpation: +TTP heel of BL feet and arch of foot (along plantar fascia)      Fig 8 swelling:   R: 60.5 cm  L: 59 cm      Orthotics: patient has custom orthotics (not wearing them today)      Gait: BL out toeing, heel to toe gait, increase BL pronation/pes planus        Functional Screening  Squat: BL out toeing and knee valgus  Lunge: NT  Lateral Step Down: NT  SL Balance: WNL  Tandem balance: WNL  SL Quarter Squat: NT    No signs or symptoms of DVT        Outcome Measures:  Other Measures  Lower Extremity Funtional Score (LEFS): 40/80       EDUCATION: Home exercise program, plan of care, activity modifications, pain management, and injury pathology       Goals: Set and discussed today  Active       PT Problem       PT Goal 1  "      Start:  04/24/25    Expected End:  07/23/25       In 4 weeks, patient will demonstrate improved ankle dorsiflexion AROM to at least 10 degrees.   In 4 weeks, patient will report a decrease in pain to <3/10 in order to ambulate without limitations.   In 4 weeks, patient will demonstrate improved gastroc/soleus flexibility to min limitation.   In 4-6 weeks, patient will demonstrate improved figure 8 swelling to <1cm.   In 6-8 weeks, patient will demonstrate improved ankle MMT by 1 grade.    By discharge, patient will improve LEFS score by 9.   By discharge, patient will be able to resume all previous level of activity without an increase in foot or ankle pain.                 Plan of care was developed with input and agreement by the patient    Treatment Performed:  Education:Initial evaluation completed, findings and plan of care discussed with patient. Patient education on purpose of interventions in order to improve LE pain, stability, and strength. Patient education on anatomy associated with diagnosis. Patient encouraged to use pain as guide with interventions and activity. Patient performed and was instructed in an individualized HEP with handout issued as below.       Access Code: [email or web address]  URL: [email or web address]  Date: 04/24/2025  Prepared by: Holley Stringer    Exercises  - Ankle Inversion with Resistance  - 2 x daily - 7 x weekly - 2 sets - 10 reps  - Ankle and Toe Plantarflexion with Resistance  - 2 x daily - 7 x weekly - 2 sets - 10 reps  - Towel Scrunches  - 2 x daily - 7 x weekly - 2 sets - 10 reps  - Long Sitting Calf Stretch with Strap  - 2 x daily - 7 x weekly - 1 sets - 2 reps - 30 second hold  - Seated Heel Raise  - 2 x daily - 7 x weekly - 2 sets - 10 reps  - Foot Roller Plantar Massage  - 2 x daily - 7 x weekly - 1 sets - 30 reps      Assessment: Patient presents with signs and symptoms consistent with plantar fascitis, resulting in limited participation in pain-free  ADLs and inability to perform at their prior level of function. Pt would benefit from physical therapy to address the impairments found & listed previously in the objective section in order to return to safe and pain-free ADLs and prior level of function.       Clinical Presentation: Stable and/or uncomplicated characteristics    Plan:     Planned Interventions include: therapeutic exercise, self-care home management, manual therapy, therapeutic activities, gait training, neuromuscular coordination, vasopneumatic, dry needling, aquatic therapy  Frequency: 1 x Week  Duration: 6 Weeks  Rehab Potential/Prognosis: Excellent    Holley Stringer, PT

## 2025-05-01 ENCOUNTER — APPOINTMENT (OUTPATIENT)
Dept: PHYSICAL THERAPY | Facility: CLINIC | Age: 35
End: 2025-05-01
Payer: COMMERCIAL

## 2025-05-01 ENCOUNTER — DOCUMENTATION (OUTPATIENT)
Dept: PHYSICAL THERAPY | Facility: CLINIC | Age: 35
End: 2025-05-01
Payer: COMMERCIAL

## 2025-05-01 NOTE — PROGRESS NOTES
No response from mom after three attempts.   Not current on MyChart  Will close encounter    Olivia Dial RN, BSN     Physical Therapy                 Therapy Communication Note    Patient Name: Chelsea Gomez  MRN: [id number]  Department:   Room: Room/bed info not found  Today's Date: 5/1/2025     Discipline: Physical Therapy          Missed Visit Reason:  Cancelled via My Chart - unable to leave work.    Missed Time: Cancel    Comment:

## 2025-05-12 ENCOUNTER — APPOINTMENT (OUTPATIENT)
Dept: OPHTHALMOLOGY | Facility: CLINIC | Age: 35
End: 2025-05-12
Payer: COMMERCIAL

## 2025-05-15 ENCOUNTER — APPOINTMENT (OUTPATIENT)
Dept: OPHTHALMOLOGY | Facility: CLINIC | Age: 35
End: 2025-05-15
Payer: COMMERCIAL

## 2025-05-17 ENCOUNTER — HOSPITAL ENCOUNTER (EMERGENCY)
Facility: HOSPITAL | Age: 35
Discharge: HOME | End: 2025-05-17
Payer: COMMERCIAL

## 2025-05-17 VITALS
HEART RATE: 97 BPM | DIASTOLIC BLOOD PRESSURE: 83 MMHG | TEMPERATURE: 98 F | OXYGEN SATURATION: 99 % | SYSTOLIC BLOOD PRESSURE: 120 MMHG | WEIGHT: 293 LBS | HEIGHT: 67 IN | BODY MASS INDEX: 45.99 KG/M2 | RESPIRATION RATE: 18 BRPM

## 2025-05-17 DIAGNOSIS — J02.0 STREP PHARYNGITIS: Primary | ICD-10-CM

## 2025-05-17 LAB
S PYO DNA THROAT QL NAA+PROBE: DETECTED
SARS-COV-2 RNA RESP QL NAA+PROBE: NOT DETECTED

## 2025-05-17 PROCEDURE — 87635 SARS-COV-2 COVID-19 AMP PRB: CPT | Performed by: PHYSICIAN ASSISTANT

## 2025-05-17 PROCEDURE — 99283 EMERGENCY DEPT VISIT LOW MDM: CPT

## 2025-05-17 PROCEDURE — 2500000001 HC RX 250 WO HCPCS SELF ADMINISTERED DRUGS (ALT 637 FOR MEDICARE OP): Performed by: PHYSICIAN ASSISTANT

## 2025-05-17 PROCEDURE — 87651 STREP A DNA AMP PROBE: CPT | Performed by: PHYSICIAN ASSISTANT

## 2025-05-17 RX ORDER — ACETAMINOPHEN 500 MG
1000 TABLET ORAL EVERY 8 HOURS PRN
Qty: 42 TABLET | Refills: 0 | Status: SHIPPED | OUTPATIENT
Start: 2025-05-17 | End: 2025-05-24

## 2025-05-17 RX ORDER — IBUPROFEN 600 MG/1
600 TABLET, FILM COATED ORAL ONCE
Status: COMPLETED | OUTPATIENT
Start: 2025-05-17 | End: 2025-05-17

## 2025-05-17 RX ORDER — IBUPROFEN 600 MG/1
600 TABLET, FILM COATED ORAL EVERY 6 HOURS PRN
Qty: 20 TABLET | Refills: 0 | Status: SHIPPED | OUTPATIENT
Start: 2025-05-17 | End: 2025-05-22

## 2025-05-17 RX ORDER — AMOXICILLIN 500 MG/1
500 CAPSULE ORAL EVERY 12 HOURS SCHEDULED
Qty: 20 CAPSULE | Refills: 0 | Status: SHIPPED | OUTPATIENT
Start: 2025-05-17 | End: 2025-05-27

## 2025-05-17 RX ADMIN — IBUPROFEN 600 MG: 600 TABLET ORAL at 07:57

## 2025-05-17 ASSESSMENT — PAIN - FUNCTIONAL ASSESSMENT: PAIN_FUNCTIONAL_ASSESSMENT: 0-10

## 2025-05-17 ASSESSMENT — PAIN DESCRIPTION - DESCRIPTORS: DESCRIPTORS: ACHING

## 2025-05-17 ASSESSMENT — PAIN DESCRIPTION - PAIN TYPE: TYPE: ACUTE PAIN

## 2025-05-17 ASSESSMENT — PAIN DESCRIPTION - ONSET: ONSET: GRADUAL

## 2025-05-17 ASSESSMENT — PAIN SCALES - GENERAL: PAINLEVEL_OUTOF10: 5 - MODERATE PAIN

## 2025-05-17 ASSESSMENT — COLUMBIA-SUICIDE SEVERITY RATING SCALE - C-SSRS
2. HAVE YOU ACTUALLY HAD ANY THOUGHTS OF KILLING YOURSELF?: NO
6. HAVE YOU EVER DONE ANYTHING, STARTED TO DO ANYTHING, OR PREPARED TO DO ANYTHING TO END YOUR LIFE?: NO
1. IN THE PAST MONTH, HAVE YOU WISHED YOU WERE DEAD OR WISHED YOU COULD GO TO SLEEP AND NOT WAKE UP?: NO

## 2025-05-17 ASSESSMENT — PAIN DESCRIPTION - FREQUENCY: FREQUENCY: CONSTANT/CONTINUOUS

## 2025-05-17 ASSESSMENT — PAIN DESCRIPTION - PROGRESSION: CLINICAL_PROGRESSION: NOT CHANGED

## 2025-05-17 ASSESSMENT — PAIN DESCRIPTION - LOCATION: LOCATION: THROAT

## 2025-05-17 NOTE — Clinical Note
Chelsea Gomez was seen and treated in our emergency department on 5/17/2025.  She may return to work on 05/19/2025.       If you have any questions or concerns, please don't hesitate to call.      Shantelle Brandon PA-C

## 2025-05-17 NOTE — ED PROVIDER NOTES
HPI     CC: Sore Throat     HPI: Chelsea Gomez is a 35 y.o. female with past medical history of epilepsy presents with concern for sore throat for 2.5 days.  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  She states he does not feel like this as she did have strep throat as a kid but reports no stabbing pain.  She states that it is an annoying ache and sometimes hurts to swallow.  She has a slight cough and throat clearing but otherwise no fever, chills, headaches, congestion, body aches.  She reports a slightly decreased appetite throughout this time.  She has tried cough drops, Chloraseptic, and drinking hot teas.  Reports no history of surgery to the neck or throat.  Denies chance of pregnancy.    ROS: 10-point review of systems was performed and is otherwise negative except as noted in HPI.      Past Medical History: Noncontributory except per HPI     Past Surgical History: Noncontributory except per HPI     Family History: Reviewed and noncontributory     Social History:  Denies tobacco. Denies ETOH. Denies illicit drugs.      RX Allergies[1]    Home Meds:   Current Outpatient Medications   Medication Instructions    acetaminophen (TYLENOL) 1,000 mg, oral, Every 8 hours PRN    amoxicillin (AMOXIL) 500 mg, oral, Every 12 hours scheduled    cholecalciferol (VITAMIN D-3) 50 mcg, oral, Daily    folic acid (FOLVITE) 1 mg, Daily    ibuprofen 600 mg, oral, Every 6 hours PRN    levETIRAcetam XR (KEPPRA XR) 2,000 mg, oral, Daily    multivitamin tablet 1 tablet, Daily    nasal spray Nayzilam 5 mg/spray (0.1 mL) spray,non-aerosol PLEASE SEE ATTACHED FOR DETAILED DIRECTIONS        ED Triage Vitals [05/17/25 0708]   Temperature Heart Rate Respirations BP   36.7 °C (98 °F) 97 18 120/83      Pulse Ox Temp Source Heart Rate Source Patient Position   99 % Temporal -- Sitting      BP Location FiO2 (%)     Left arm --         Heart Rate:  [97]   Temperature:  [36.7 °C (98 °F)]  "  Respirations:  [18]   BP: (120)/(83)   Height:  [170.2 cm (5' 7\")]   Weight:  [145 kg (320 lb)]   Pulse Ox:  [99 %]      Physical Exam:  Physical Exam  Constitutional:       General: She is not in acute distress.     Appearance: Normal appearance. She is not ill-appearing.   HENT:      Head: Normocephalic and atraumatic.      Right Ear: Tympanic membrane and external ear normal.      Left Ear: Tympanic membrane and external ear normal.      Nose: Nose normal. No congestion or rhinorrhea.      Mouth/Throat:      Mouth: Mucous membranes are moist. No oral lesions.      Pharynx: Uvula midline. Posterior oropharyngeal erythema present. No pharyngeal swelling or uvula swelling.      Tonsils: No tonsillar exudate or tonsillar abscesses. 1+ on the right. 1+ on the left.   Eyes:      Extraocular Movements: Extraocular movements intact.      Conjunctiva/sclera: Conjunctivae normal.      Pupils: Pupils are equal, round, and reactive to light.   Cardiovascular:      Rate and Rhythm: Normal rate and regular rhythm.      Pulses: Normal pulses.   Pulmonary:      Effort: Pulmonary effort is normal. No respiratory distress.      Breath sounds: Normal breath sounds.   Abdominal:      General: Abdomen is flat.      Palpations: Abdomen is soft.      Tenderness: There is no abdominal tenderness.   Musculoskeletal:         General: Normal range of motion.      Cervical back: Normal range of motion and neck supple.   Skin:     General: Skin is warm and dry.      Capillary Refill: Capillary refill takes less than 2 seconds.   Neurological:      General: No focal deficit present.      Mental Status: She is alert and oriented to person, place, and time.   Psychiatric:         Mood and Affect: Mood normal.          Diagnostic Results        Labs Reviewed   GROUP A STREPTOCOCCUS, PCR - Abnormal       Result Value    Group A Strep PCR Detected (*)    SARS-COV-2 PCR - Normal    Coronavirus 2019, PCR Not Detected      Narrative:     This assay " is an FDA-cleared, in vitro diagnostic nucleic acid amplification test for the qualitative detection and differentiation of SARS CoV-2 from nasopharyngeal specimens collected from individuals with signs and symptoms of respiratory tract infections, and has been validated for use at [facility]. Negative results do not preclude COVID-19 infections and should not be used as the sole basis for diagnosis, treatment, or other management decisions. Testing for SARS CoV-2 is recommended only for patients who meet current clinical and/or epidemiological criteria defined by federal, state, or local public health directives.         No orders to display                 [place] Coma Scale Score: 15                  Procedure  Procedures    ED Course & MDM   Assessment/Plan:     Medications   ibuprofen tablet 600 mg (600 mg oral Given 5/17/25 0757)        Diagnoses as of 05/17/25 0906   Strep pharyngitis       Medical Decision Making    Chelsea Gomez is a 35 y.o. female with past medical history of epilepsy presents for sore throat.  The patient is nontoxic-appearing and vital signs are normal.  Based on presentation, differential diagnosis includes COVID, acute viral pharyngitis, strep pharyngitis, mononucleosis, oral STD, or other viral infection.  Will obtain swabs for COVID and strep.  Will administer Motrin for pain control.  Low suspicion for mono or oral STD given patient history.  Swabs were positive for strep.  Patient was made aware of the results.  She has no signs of peritonsillar abscess, retropharyngeal abscess, or systemic signs of infection at this time.  No further workup indicated.    Strep Pharyngitis: Educated on the results.  We discussed that this diagnosis requires antibiotics.  Patient was given amoxicillin and encouraged to take this in its entirety.  We discussed that this is highly contagious, so they should monitor other family members for symptoms.  We discussed that it is  important to take antibiotics to prevent the spread of the bacteria towards the heart.  May also use Tylenol and Motrin over-the-counter for additional pain control.  A secondary consequence of strep throat is often dehydration due to the sore throat.  Motrin should help with Her sore throat which would allow them to eat and drink well.  We discussed red flag symptoms to return to the emergency department which can include new fever or chills after starting antibiotics, inability to tolerate secretions, change in voice, chest pain, shortness of breath, or any other concerning symptoms. Patient agreeable to plan of care and felt comfortable returning home..     Disposition: Home    ED Prescriptions       Medication Sig Dispense Start Date End Date Auth. Provider    amoxicillin (Amoxil) 500 mg capsule Take 1 capsule (500 mg) by mouth every 12 hours for 10 days. 20 capsule 5/17/2025 5/27/2025 Shantelle Brandon PA-C    ibuprofen 600 mg tablet Take 1 tablet (600 mg) by mouth every 6 hours if needed for moderate pain (4 - 6) for up to 5 days. 20 tablet 5/17/2025 5/22/2025 Shantelle Brandon PA-C    acetaminophen (Tylenol) 500 mg tablet Take 2 tablets (1,000 mg) by mouth every 8 hours if needed for mild pain (1 - 3) for up to 7 days. 42 tablet 5/17/2025 5/24/2025 Shantelle Brandon PA-C            Social Determinants Affecting Care: none     Shantelle Brandon PA-C    This note was dictated by speech recognition. Minor errors in transcription may be present.       [1] No Known Allergies       Shantelle Brandon PA-C  05/17/25 0906

## 2025-05-19 ENCOUNTER — APPOINTMENT (OUTPATIENT)
Dept: PHYSICAL THERAPY | Facility: CLINIC | Age: 35
End: 2025-05-19
Payer: COMMERCIAL

## 2025-05-19 ENCOUNTER — DOCUMENTATION (OUTPATIENT)
Dept: PHYSICAL THERAPY | Facility: CLINIC | Age: 35
End: 2025-05-19
Payer: COMMERCIAL

## 2025-05-19 NOTE — PROGRESS NOTES
Physical Therapy                 Therapy Communication Note    Patient Name: Chelsea Gomez  MRN: [id number]  Department:   Room: Room/bed info not found  Today's Date: 5/19/2025     Discipline: Physical Therapy    Missed Visit:   5/19/2025    Missed Visit Reason:  cancelled via My Chart - has strep throat    Missed Time: Cancel    Comment:

## 2025-05-28 ENCOUNTER — APPOINTMENT (OUTPATIENT)
Dept: PHYSICAL THERAPY | Facility: CLINIC | Age: 35
End: 2025-05-28
Payer: COMMERCIAL

## 2025-05-28 ENCOUNTER — DOCUMENTATION (OUTPATIENT)
Dept: PHYSICAL THERAPY | Facility: CLINIC | Age: 35
End: 2025-05-28
Payer: COMMERCIAL

## 2025-05-28 NOTE — PROGRESS NOTES
Physical Therapy                 Therapy Communication Note    Patient Name: Chelsea Gomez  MRN: [id number]  Department:   Room: Room/bed info not found  Today's Date: 5/28/2025     Discipline: Physical Therapy    Missed Visit:   5/28/25      Missed Visit Reason:  Cancelled via My Chart - stuck at work and unable to leave in time for appointment.    Missed Time: Cancel    Comment:

## 2025-06-01 DIAGNOSIS — G40.919 EPILEPSY, UNSPECIFIED, INTRACTABLE, WITHOUT STATUS EPILEPTICUS: ICD-10-CM

## 2025-06-02 RX ORDER — FOLIC ACID 1 MG/1
1 TABLET ORAL DAILY
Qty: 30 TABLET | Refills: 11 | Status: SHIPPED | OUTPATIENT
Start: 2025-06-02

## 2025-06-03 ENCOUNTER — DOCUMENTATION (OUTPATIENT)
Dept: PHYSICAL THERAPY | Facility: CLINIC | Age: 35
End: 2025-06-03
Payer: COMMERCIAL

## 2025-06-03 ENCOUNTER — APPOINTMENT (OUTPATIENT)
Dept: PHYSICAL THERAPY | Facility: CLINIC | Age: 35
End: 2025-06-03
Payer: COMMERCIAL

## 2025-06-03 NOTE — PROGRESS NOTES
Physical Therapy  Discharge Summary    Referral/Discharge Info:  Date of Discharge: 6/3/2025  Date of Last Visit: 4/24/2025  Date of Evaluation: 4/24/2025  Number of Visits Attended: 1  Referred by: Dr. Brandon   Referred for: BL plantar fascitis     Problems/Issues Addressed:  HEP prescribed for BL plantar fascitis and heel pain      Status at Discharge:  Unable to assess as patient had cancelled all follow up appointments.     Reason for Discharge:  Poor attendance         Holley Stringer, PT

## 2025-06-19 DIAGNOSIS — E55.9 VITAMIN D DEFICIENCY: ICD-10-CM

## 2025-06-20 RX ORDER — ACETAMINOPHEN 500 MG
TABLET ORAL DAILY
Qty: 90 CAPSULE | Refills: 1 | Status: SHIPPED | OUTPATIENT
Start: 2025-06-20

## 2025-07-23 DIAGNOSIS — R56.9 SEIZURE (MULTI): ICD-10-CM

## 2025-07-24 DIAGNOSIS — R56.9 SEIZURE (MULTI): ICD-10-CM

## 2025-07-24 RX ORDER — LEVETIRACETAM 500 MG/1
2000 TABLET, EXTENDED RELEASE ORAL DAILY
Qty: 120 TABLET | Refills: 2 | Status: SHIPPED | OUTPATIENT
Start: 2025-07-24

## 2025-07-24 RX ORDER — LEVETIRACETAM 500 MG/1
2000 TABLET, EXTENDED RELEASE ORAL DAILY
Qty: 120 TABLET | Refills: 2 | Status: SHIPPED | OUTPATIENT
Start: 2025-07-24 | End: 2025-07-24 | Stop reason: SDUPTHER